# Patient Record
Sex: FEMALE | Race: WHITE
[De-identification: names, ages, dates, MRNs, and addresses within clinical notes are randomized per-mention and may not be internally consistent; named-entity substitution may affect disease eponyms.]

---

## 2018-07-17 ENCOUNTER — HOSPITAL ENCOUNTER (EMERGENCY)
Dept: HOSPITAL 97 - ER | Age: 73
Discharge: HOME | End: 2018-07-17
Payer: COMMERCIAL

## 2018-07-17 VITALS — TEMPERATURE: 99 F

## 2018-07-17 VITALS — OXYGEN SATURATION: 98 % | DIASTOLIC BLOOD PRESSURE: 64 MMHG | SYSTOLIC BLOOD PRESSURE: 139 MMHG

## 2018-07-17 DIAGNOSIS — Z95.1: ICD-10-CM
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
## 2018-07-17 DIAGNOSIS — I10: ICD-10-CM

## 2018-07-17 DIAGNOSIS — Z79.01: ICD-10-CM

## 2018-07-17 DIAGNOSIS — S80.01XA: ICD-10-CM

## 2018-07-17 DIAGNOSIS — Z79.4: ICD-10-CM

## 2018-07-17 DIAGNOSIS — E11.9: ICD-10-CM

## 2018-07-17 DIAGNOSIS — I25.2: ICD-10-CM

## 2018-07-17 DIAGNOSIS — S50.311A: ICD-10-CM

## 2018-07-17 DIAGNOSIS — S80.02XA: ICD-10-CM
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
## 2018-07-17 DIAGNOSIS — S50.312A: ICD-10-CM

## 2018-07-17 DIAGNOSIS — S00.80XA: Primary | ICD-10-CM

## 2018-07-17 PROCEDURE — 72125 CT NECK SPINE W/O DYE: CPT

## 2018-07-17 PROCEDURE — 70450 CT HEAD/BRAIN W/O DYE: CPT

## 2018-07-17 PROCEDURE — 99284 EMERGENCY DEPT VISIT MOD MDM: CPT

## 2018-07-17 NOTE — ER
Nurse's Notes                                                                                     

 [facility]                                                                

Name: Vianey Ly                                                                            

Age: 73 yrs                                                                                       

Sex: Female                                                                                       

: 1945                                                                                   

MRN: [id number]                                                                                   

Arrival Date: 2018                                                                          

Time: 19:15                                                                                       

Account#: [id number]                                                                            

Bed 15                                                                                            

Private MD: Lang Melton                                                                         

Diagnosis: Other slipping, tripping and stumbling and falls;Contusion of left knee;Contusion of   

  right knee;Abrasion of elbow;Superficial injury of head-forehead                                

                                                                                                  

Presentation:                                                                                     

                                                                                             

19:36 Presenting complaint: Patient states: Tripped over dog, hit forehead on wall and        lp1 

      landing on knees and elbows; No LOC; Complaint of pain to forehead and right knee;          

      Ambulatory on arrival to ED, swelling to right knee and bruising to forehead noted.         

      Care prior to arrival: None. Mechanism of Injury: Fall from standing position.              

19:36 Acuity: GUILLERMO 4                                                                           lp1 

19:36 Method Of Arrival: Ambulatory                                                           lp1 

19:36 Method Of Arrival: Ambulatory                                                           lp1 

19:43 Transition of care: patient was not received from another setting of care. Onset of     lp1 

      symptoms was 2018 at 17:30. Risk Assessment: Do you want to hurt yourself or       

      someone else? Patient reports no desire to harm self or others. Initial Sepsis Screen:      

      Does the patient meet any 2 criteria? No. Patient's initial sepsis screen is negative.      

      Does the patient have a suspected source of infection? No. Patient's initial sepsis         

      screen is negative.                                                                         

                                                                                                  

Historical:                                                                                       

- Allergies:                                                                                      

19:42 No Known Allergies;                                                                     lp1 

- Home Meds:                                                                                      

19:42 Plavix Oral [Active]; levothyroxine oral [Active]; Allopurinol Oral [Active]; Lantus    lp1 

      Sub-Q [Active]; Lisinopril Oral [Active]; Metformin Oral [Active]; Imdur Oral [Active];     

      Simvastatin Oral [Active];                                                                  

- PMHx:                                                                                           

19:42 Myocardial infarction; Diabetes - IDDM; Hypertension; Gout; Hyperlipidemia;             lp1 

- PSHx:                                                                                           

19:42 CABG; Tonsillectomy;                                                                    lp1 

                                                                                                  

- Immunization history:: Adult Immunizations up to date.                                          

- Social history:: Smoking status: Patient/guardian denies using tobacco.                         

- Ebola Screening: : No symptoms or risks identified at this time.                                

                                                                                                  

                                                                                                  

Screenin:43 Abuse screen: Denies threats or abuse. Denies injuries from another. Nutritional        lp1 

      screening: No deficits noted. Tuberculosis screening: No symptoms or risk factors           

      identified. Fall Risk None identified.                                                      

                                                                                                  

Assessment:                                                                                       

20:01 General: Appears in no apparent distress. Behavior is calm, cooperative, appropriate    lp1 

      for age. Pain: Complains of pain in forehead, right knee and left knee Pain currently       

      is 5 out of 10 on a pain scale. Quality of pain is described as aching. Neuro: Level of     

      Consciousness is awake, alert, obeys commands, Oriented to person, place, time,             

      situation, Moves all extremities. Full function Gait is steady, Pupils are PERRLA,          

      Reports Pain to left side of neck. Cardiovascular: Patient's skin is warm and dry.          

      Respiratory: Respiratory effort is even, unlabored. GI: No signs and/or symptoms were       

      reported involving the gastrointestinal system. : No signs and/or symptoms were           

      reported regarding the genitourinary system. EENT: No signs and/or symptoms were            

      reported regarding the EENT system. Derm: Bruising that is dark purple, on forehead and     

      right knee. Musculoskeletal: Circulation, motion, and sensation intact. Range of            

      motion: intact in all extremities.                                                          

20:30 Reassessment: Patient returned from radiology at this time.                             lp1 

21:15 Reassessment: Patient appears in no apparent distress at this time. Patient refused     lp1 

      need to ACE wrap right knee, states "I will wrap it at home".                               

                                                                                                  

Vital Signs:                                                                                      

19:38  / 58; Pulse 88; Resp 18; Temp 99.0(O); Pulse Ox 97% on R/A; Weight 81.65 kg;     lp1 

      Height 5 ft. 0 in. (152.40 cm); Pain 5/10;                                                  

21:32  / 64; Pulse 76; Resp 18; Pulse Ox 98% on R/A;                                    lp1 

19:38 Body Mass Index 35.15 (81.65 kg, 152.40 cm)                                             lp1 

                                                                                                  

Randall Coma Score:                                                                               

19:38 Eye Response: spontaneous(4). Verbal Response: oriented(5). Motor Response: obeys       lp1 

      commands(6). Total: 15.                                                                     

                                                                                                  

ED Course:                                                                                        

19:15 Patient arrived in ED.                                                                  ds1 

19:15 Lang Melton MD is Private Physician.                                                 ds1 

19:23 Marinas, Elmer, NP is PHCP.                                                           pm1 

19:23 Betito Eller MD is Attending Physician.                                                    pm1 

19:34 Ella Hayward, RN is Primary Nurse.                                                       lp1 

19:38 Triage completed.                                                                       lp1 

19:38 Arm band placed on right wrist.                                                         lp1 

19:43 Patient has correct armband on for positive identification.                             lp1 

19:48 Patient moved to CT.                                                                    nj  

19:56 CT completed. Patient tolerated procedure well. Patient moved back from CT.             nj  

19:56 Patient moved to radiology.                                                             nj  

19:56 CT Head C Spine In Process Unspecified.                                                 EDMS

20:10 X-ray completed. Patient tolerated procedure well. Patient moved back from radiology.   1 

20:10 Knee Right 3 View XRAY In Process Unspecified.                                          EDMS

20:10 Knee Left 3 View XRAY In Process Unspecified.                                           EDMS

21:01 Lang Melton MD is Referral Physician.                                                pm1 

21:21 No provider procedures requiring assistance completed. Patient did not have IV access   lp1 

      during this emergency room visit.                                                           

                                                                                                  

Administered Medications:                                                                         

No medications were administered                                                                  

                                                                                                  

                                                                                                  

Outcome:                                                                                          

21:02 Discharge ordered by MD.                                                                pm1 

21:33 Discharged to home ambulatory.                                                          lp1 

21:33 Condition: good                                                                             

21:33 Discharge instructions given to patient, Instructed on discharge instructions, follow       

      up and referral plans. Demonstrated understanding of instructions, follow-up care.          

21:34 Patient left the ED.                                                                    lp1 

                                                                                                  

Signatures:                                                                                       

Dispatcher [employer]                           EDMS                                                 

Brunilda Calderon                               1                                                  

Liberty Robledo                                1                                                  

Ella Hayward, AMPARO                         RN   lp1                                                  

Elmer Urias NP                    NP   pm1                                                  

Vimal Amezquita                                                   

                                                                                                  

**************************************************************************************************

## 2018-07-17 NOTE — RAD REPORT
EXAM DESCRIPTION:  CT - [id number] - 7/17/2018 7:56 pm

 

CLINICAL HISTORY:  Fall, head and neck injury

 

COMPARISON:  None.

 

TECHNIQUE:  Axial 5 mm thick images of the head were obtained.  Axial 2 mm thick images of the cervic
al spine were obtained with sagittal and coronal reconstruction images generated and reviewed.

 

All CT scans are performed using dose optimization technique as appropriate and may include automated
 exposure control or mA/KV adjustment according to patient size.

 

FINDINGS:

No intracranial hemorrhage, mass, edema or acute intracranial finding. No suspicion for acute infarct
ion. Mild atrophy and chronic ischemic changes are present. Ventricles are in proportion to any volum
e loss. Mastoid air cells and paranasal sinuses are clear. No globe or orbit abnormality seen.

 

Cervical body height and alignment are normal. All disc spaces except C2-3 are narrowed. There are pr
ominent anterior endplate spurs. Uncovertebral joint hypertrophy causes foraminal stenosis bilateral 
at C3-4, C4-5 and right C5-6. Mild foraminal encroachment at C6-7. Canal is borderline stenotic at C3
-4 with spinal stenosis at C4-5 and significantly at C5-6. Facet degenerative changes are comparative
ly mild. No fracture or acute bony abnormality.

 

No paraspinal mass or hematoma.

 

IMPRESSION:  Negative CT head examination for acute or significant finding.

 

Advanced cervical spine degenerative change including multilevel central spinal stenosis and foramina
l stenosis. No fracture or acute finding.

## 2018-07-17 NOTE — RAD REPORT
EXAM DESCRIPTION:  RAD - Knee Right 3 View - 7/17/2018 8:14 pm

 

CLINICAL HISTORY:  Trip and fall, knee pain

 

COMPARISON:  None.

 

FINDINGS:  No fracture, dislocation or periosteal reaction.No joint effusion seen. No joint space jef
rowing. No foreign body or other soft tissue abnormality.

 

 

IMPRESSION:  Negative right knee.

 

Clinical concerns for internal derangement or occult bony injury could be further assessed with MR im
aging.

## 2018-07-17 NOTE — RAD REPORT
EXAM DESCRIPTION:  RAD - Knee Left 3 View - 7/17/2018 8:14 pm

 

CLINICAL HISTORY:  Trip and fall, knee pain

 

COMPARISON:  None.

 

FINDINGS:  No fracture, dislocation or periosteal reaction.No joint effusion seen. No joint space jef
rowing. No soft tissue abnormality.

 

 

IMPRESSION:  Negative left knee.

 

Clinical concerns for internal derangement or occult bony injury could be further assessed with MR im
aging.

## 2021-06-30 ENCOUNTER — HOSPITAL ENCOUNTER (EMERGENCY)
Dept: HOSPITAL 97 - ER | Age: 76
Discharge: HOME | End: 2021-06-30
Payer: COMMERCIAL

## 2021-06-30 VITALS — TEMPERATURE: 99.8 F

## 2021-06-30 DIAGNOSIS — E11.9: ICD-10-CM

## 2021-06-30 DIAGNOSIS — E03.9: ICD-10-CM

## 2021-06-30 DIAGNOSIS — Z79.82: ICD-10-CM

## 2021-06-30 DIAGNOSIS — U07.1: Primary | ICD-10-CM

## 2021-06-30 DIAGNOSIS — Z79.4: ICD-10-CM

## 2021-06-30 DIAGNOSIS — I10: ICD-10-CM

## 2021-06-30 LAB
ALBUMIN SERPL BCP-MCNC: 3.5 G/DL (ref 3.4–5)
ALP SERPL-CCNC: 95 U/L (ref 45–117)
ALT SERPL W P-5'-P-CCNC: 22 U/L (ref 12–78)
AST SERPL W P-5'-P-CCNC: 46 U/L (ref 15–37)
BUN BLD-MCNC: 28 MG/DL (ref 7–18)
GLUCOSE SERPLBLD-MCNC: 127 MG/DL (ref 74–106)
HCT VFR BLD CALC: 38.1 % (ref 36–45)
LIPASE SERPL-CCNC: 28 U/L (ref 73–393)
LYMPHOCYTES # SPEC AUTO: 0.9 K/UL (ref 0.7–4.9)
PMV BLD: 8.1 FL (ref 7.6–11.3)
POTASSIUM SERPL-SCNC: 4.4 MMOL/L (ref 3.5–5.1)
RBC # BLD: 4.07 M/UL (ref 3.86–4.86)
SARS-COV-2 RNA RESP QL NAA+PROBE: POSITIVE

## 2021-06-30 PROCEDURE — 87081 CULTURE SCREEN ONLY: CPT

## 2021-06-30 PROCEDURE — 83690 ASSAY OF LIPASE: CPT

## 2021-06-30 PROCEDURE — 36415 COLL VENOUS BLD VENIPUNCTURE: CPT

## 2021-06-30 PROCEDURE — 85025 COMPLETE CBC W/AUTO DIFF WBC: CPT

## 2021-06-30 PROCEDURE — 80053 COMPREHEN METABOLIC PANEL: CPT

## 2021-06-30 PROCEDURE — 0241U: CPT

## 2021-06-30 PROCEDURE — 71045 X-RAY EXAM CHEST 1 VIEW: CPT

## 2021-06-30 PROCEDURE — 81003 URINALYSIS AUTO W/O SCOPE: CPT

## 2021-06-30 PROCEDURE — 87070 CULTURE OTHR SPECIMN AEROBIC: CPT

## 2021-06-30 PROCEDURE — 84484 ASSAY OF TROPONIN QUANT: CPT

## 2021-06-30 NOTE — EDPHYS
Physician Documentation                                                                           

 Formerly [facility]                                                                 

Name: Vianey Ly                                                                            

Age: 76 yrs                                                                                       

Sex: Female                                                                                       

: 1945                                                                                   

MRN: [id number]                                                                                   

Arrival Date: 2021                                                                          

Time: 14:34                                                                                       

Account#: [id number]                                                                            

Bed 24                                                                                            

Private MD:                                                                                       

ED Physician Marvel James                                                                    

HPI:                                                                                              

                                                                                             

21:15 This 76 yrs old  Female presents to ER via Wheelchair with complaints of       ma2 

      General Weakness, shaky, Arm Pain.                                                          

21:15 This 76 yrs old  Female presents to ER via Wheelchair with complaints of       ma2 

      General Weakness, shaky, chills, fever.                                                     

21:15 Onset: The symptoms/episode began/occurred gradually, 3 day(s) ago. Associated signs    ma2 

      and symptoms: Pertinent negatives: deformity, nausea, pain, tingling. Severity of           

      symptoms: At their worst the symptoms were mild, in the emergency department the            

      symptoms are unchanged. The patient has experienced a previous episode, The patient has     

      experienced similar episodes in the past.                                                   

                                                                                                  

Historical:                                                                                       

- Allergies:                                                                                      

15:11 No Known Allergies;                                                                     jl7 

- Home Meds:                                                                                      

15:11 Allopurinol Oral [Active]; Hydrochlorothiazide Oral [Active]; Isosorbide Dinitrate Oral jl7 

      [Active]; levothyroxine oral [Active]; liothyronine oral [Active]; Metformin Oral           

      [Active]; metoprolol succinate oral [Active]; Simvastatin Oral [Active]; Aspirin Oral       

      [Active]; doxazosin oral [Active]; insulin glargine subcutaneous Sub-Q [Active]; Plavix     

      Oral [Active]; pioglitazone oral [Active];                                                  

- PMHx:                                                                                           

15:11 Diabetes - IDDM; Gout; Hyperlipidemia; Hypertension; Myocardial infarction;             jl7 

      Hypothyroidism;                                                                             

                                                                                                  

- Immunization history:: Adult Immunizations not up to date, Client reports having NOT            

  received the Covid vaccine.                                                                     

- Social history:: Smoking status: Patient denies any tobacco usage or history of.                

- Family history:: not pertinent.                                                                 

                                                                                                  

                                                                                                  

ROS:                                                                                              

21:15 Constitutional: Negative for fever, chills, and weight loss.                            ma2 

21:15 All other systems are negative.                                                             

                                                                                                  

Exam:                                                                                             

21:15 Constitutional:  This is a well developed, well nourished patient who is awake, alert,  ma2 

      and in no acute distress. ENT:  Nares patent. No nasal discharge, no septal                 

      abnormalities noted.  Tympanic membranes are normal and external auditory canals are        

      clear.  Oropharynx with no redness, swelling, or masses, exudates, or evidence of           

      obstruction, uvula midline.  Mucous membranes moist. Neck:  Trachea midline, no             

      thyromegaly or masses palpated, and no cervical lymphadenopathy.  Supple, full range of     

      motion without nuchal rigidity, or vertebral point tenderness.  No Meningismus.             

      Chest/axilla:  Normal chest wall appearance and motion.  Nontender with no deformity.       

      No lesions are appreciated. Cardiovascular:  Regular rate and rhythm with a normal S1       

      and S2.  No gallops, murmurs, or rubs.  Normal PMI, no JVD.  No pulse deficits.             

      Respiratory:  Lungs have equal breath sounds bilaterally, clear to auscultation and         

      percussion.  No rales, rhonchi or wheezes noted.  No increased work of breathing, no        

      retractions or nasal flaring. Abdomen/GI:  Soft, non-tender, with normal bowel sounds.      

      No distension or tympany.  No guarding or rebound.  No evidence of tenderness               

      throughout. Skin:  Warm, dry with normal turgor.  Normal color with no rashes, no           

      lesions, and no evidence of cellulitis. MS/ Extremity:  Pulses equal, no cyanosis.          

      Neurovascular intact.  Full, normal range of motion. Neuro:  Awake and alert, GCS 15,       

      oriented to person, place, time, and situation.  Cranial nerves II-XII grossly intact.      

      Motor strength 5/5 in all extremities.  Sensory grossly intact.  Cerebellar exam            

      normal.  Normal gait.                                                                       

                                                                                                  

Vital Signs:                                                                                      

15:07  / 50; Pulse 79; Resp 17; Temp 99.8(O); Pulse Ox 96% on R/A; Weight 75.3 kg; Pain jl7 

      /10;                                                                                       

19:30  / 60; Pulse 91; Resp 16; Pulse Ox 96% on R/A;                                    vg1 

20:00  / 69; Pulse 86; Resp 16; Pulse Ox 95% on R/A;                                    vg1 

                                                                                                  

MDM:                                                                                              

21:15 Differential diagnosis: closed fracture, contusion, abrasion, tendonitis, uti vs covid  ma2 

      19 vs pneumonia, fever.                                                                     

22:17 Data reviewed: vital signs, nurses notes. Counseling: I had a detailed discussion with  ma2 

      the patient and/or guardian regarding: the historical points, exam findings, and any        

      diagnostic results supporting the discharge/admit diagnosis, the presence of at least       

      one elevated blood pressure reading (>120/80) during this emergency department visit,       

      the need for outpatient follow up. Response to treatment: the patient's symptoms have       

      markedly improved after treatment.                                                          

22:18 Patient medically screened.                                                             ma2 

                                                                                                  

                                                                                             

19:12 Order name: Strep; Complete Time: 20:59                                                 ma2 

                                                                                             

19:12 Order name: CBC with Diff; Complete Time: 20:59                                         ma2 

                                                                                             

19:12 Order name: CMP; Complete Time: 20:59                                                   ma2 

                                                                                             

19:12 Order name: CXR XRAY; Complete Time: 20:59                                              ma2 

                                                                                             

19:12 Order name: Lipase; Complete Time: 20:59                                                ma2 

                                                                                             

19:59 Order name: Troponin (emerg Dept Use Only)                                              ma2 

                                                                                             

20:00 Order name: Troponin (Emerg Dept Use Only); Complete Time: 20:59                        EDMS

                                                                                             

20:18 Order name: Throat Culture                                                              EDMS

                                                                                             

20:59 Order name: COVID-19/FLU A+B/RSV; Complete Time: 20:59                                  EDMS

                                                                                             

21:24 Order name: Urine Dipstick-Ancillary; Complete Time: 21:44                              EDMS

                                                                                             

19:12 Order name: Droplet/Contact Precautions; Complete Time: 19:33                           ma2 

                                                                                             

19:12 Order name: Labs collected and sent; Complete Time: 20:18                               ma2 

                                                                                             

19:12 Order name: O2 Per Protocol; Complete Time: 20:18                                       ma2 

                                                                                             

19:12 Order name: Urine Dipstick-Ancillary (obtain specimen); Complete Time: 21:23            ma2 

                                                                                                  

Administered Medications:                                                                         

20:10 Drug: NS 0.9% 500 ml Route: IV; Rate: bolus; Site: right antecubital;                   vg1 

22:00 Follow up: IV SiteChange: left antecubital; IV SiteChange Reason: Infiltration          vg1 

20:18 CANCELLED (Physician Discretion): NS 0.9% 1000 ml IV at 1 bolus Per protocol; 1000 mL   vg1 

      bolus                                                                                       

22:00 Drug: MethylPrednisoLONE 125 mg Route: IVP; Site: left antecubital;                     vg1 

22:00 Drug: AZITHromycin 500 mg Route: IVPB; Infused Over: 1 hrs; Site: left antecubital;     vg1 

                                                                                                  

                                                                                                  

Disposition Summary:                                                                              

21 22:18                                                                                    

Discharge Ordered                                                                                 

      Location: Home                                                                          ma2 

      Condition: Stable                                                                       ma2 

      Diagnosis                                                                                   

        - Other specified viral diseases - COVID -19                                          ma2 

      Followup:                                                                               ma2 

        - With: Private Physician                                                                  

        - When: Tomorrow                                                                           

        - Reason: If symptoms return, Continuance of care                                          

      Discharge Instructions:                                                                     

        - Discharge Summary Sheet                                                             ma2 

        - COVID-19                                                                            ma2 

        - COVID-19 Frequently Asked Questions                                                 ma2 

        - 10 Things You Can Do to Manage Your COVID-19 Symptoms at Home - [facility] [place][note 3]                 ma2 

      Forms:                                                                                      

        - Medication Reconciliation Form                                                      ma2 

        - Thank You Letter                                                                    ma2 

        - Antibiotic Education                                                                ma2 

        - Prescription Opioid Use                                                             ma2 

      Prescriptions:                                                                              

        - Diclofenac Sodium 75 mg Oral Tablet Sustained Release                                    

            - take 1 tablet by ORAL route 2 times per day; 30 tablet; Refills: 0, Product     ma2 

      Selection Permitted                                                                         

        - Zithromax Z-Refugio 250 mg Oral Tablet                                                       

            - take 1 tablet by ORAL route as directed for 5 days Day 1 - take two (2) tablets ma2 

      one time.  Day 2, 3, 4 , 5 take one (1) tablet once daily.; 6 tablet; Refills: 0,           

      Product Selection Permitted                                                                 

        - Medrol (Refugio) 4 mg Oral Tablets, Dose Pack                                                

            - take 1 tablet by ORAL route as directed - follow package instructions; 1        ma2 

      packet; Refills: 0, Product Selection Permitted                                             

Signatures:                                                                                       

Dispatcher [employer]                           Daniel Urrutia, AMPARO                        RN   jl7                                                  

Marvel James MD MD   ma2                                                  

Marlene Garber RN                    RN   vg1                                                  

                                                                                                  

Corrections: (The following items were deleted from the chart)                                    

19:59 19:12 Influenza Screen (A ordered. EDMS                                                 EDMS

19:59 19:13 Influenza Screen (A \T\ B)+BA.LAB.BRZ ordered. EDMS                                 EDMS

19:59 19:13 Respiratory Syncytial Virus Ag+BA.LAB.BRZ ordered. EDMS                           EDMS

20:18 19:12 NS 0.9% 1000 ml IV at 1 bolus Per protocol; 1000 mL bolus ordered. ma2            vg1 

20:18 20:18 NS 0.9% 1000 ml IV at 1 bolus Per protocol; 1000 mL bolus given. vg1              vg1 

20:18 20:18 NS 0.9% 1000 ml IV at 1 bolus Per protocol; 1000 mL bolus ordered. vg1            vg1 

                                                                                                  

**************************************************************************************************

## 2021-06-30 NOTE — XMS REPORT
Continuity of Care Document

                            Created on:2021



Patient:GABRIELLE COLLAZO

Sex:Female

:1945

External Reference #:[id number]





Demographics







                          Address                   [address] [place] [address]

 

                          Home Phone                [phone]

 

                          Work Phone                [phone]

 

                          Mobile Phone              [phone]

 

                          Email Address             NONE

 

                          Preferred Language        English

 

                          Marital Status            Unknown

 

                          Amish Affiliation     Unknown

 

                          Race                      Unknown

 

                          Additional Race(s)        Unavailable



                                                    White

 

                          Ethnic Group              Unknown









Author







                          Organization              [facility]

t

 

                          Address                   [address]



                                                    [place] [address]

 

                          Phone                     [phone]









Support







                Name            Relationship    Address         Phone

 

                Aliyah       Spouse          [address]   [phone]



                                                [place] [address] 









Care Team Providers







                    Name                Role                Phone

 

                    Linh CALDERA,      Primary Care Physician [phone]

 

                    Lloyd CALDERA            Attending Clinician [phone]









Payers







           Payer Name Policy Type Policy     Effective Date Expiration Date Sour

ce



                                 Number                           

 

           MEDICAREMEDICARE PART            [id number] 2006-10-01            Varinder DEJESUS AND                            00:00:00              [facility]



           [id number]                                             



           [place]                                             

 

           COMMERCIAL MISCMISC            [id number] 2017            [place]

on



           [id number]                       00:00:00              Met

[facility]



           /2017-Abhi                                             



           cial                                                   







Problems







       Condition Condition Condition Status Onset  Resolution Last   Treating Co

mments 

Source



       Name   Details Category        Date   Date   Treatment Clinician        



                                                 Date                 

 

       Lagophthal Lagophthal Disease Active                              H

[facility]



       mos of mos of               8-15                               [facility]



       left lower left lower               00:00:                             st



       eyelid eyelid               00                                 







Allergies, Adverse Reactions, Alerts

This patient has no known allergies or adverse reactions.



Social History







           Social Habit Start Date Stop Date  Quantity   Comments   Source

 

           Tobacco use and 2020 Never used            [facility] floododi



           exposure   00:00:00   00:00:00                         

 

           Alcohol intake 2020 Current drinker of            Varinder Suazo



                      00:00:00   00:00:00   alcohol (finding)            

 

           Alcohol Comment 2018-10-17 2018-10-17 occasional            [facility] floododist



                      00:00:00   00:00:00                         

 

           Sex Assigned At 1945                       Elias mcclure



           Birth      00:00:00   00:00:00                         









                Smoking Status  Start Date      Stop Date       Source

 

                Never smoker                                    [facility]

t







Medications







       Ordered Filled Start  Stop   Current Ordering Indication Dosage Frequency

 Signature

                    Comments            Components          Source



     Medication Medication Date Date Medication? Clinician                (SIG) 

          



     Name Name                                                   

 

     isosorbide      2020-0      Yes            30mg QD   Take 30 mg           [facility]

[place]



     dinitrate      1-24                               by mouth           Method

i



     (ISORDIL)      10:57:                               every           st



     30 MG      13                                 morning.           



     tablet                                                        

 

     aspirin      2020-0      Yes            81mg QD   Take 81 mg           [facility]

[place]



     (ECOTRIN)      1-24                               by mouth           Method

i



     81 MG      10:57:                               daily.           st



     enteric      13                                                



     coated                                                        



     tablet                                                        

 

     levothyroxi      2020-0      Yes            137ug QD   Take 137           [facility]

[place]



     ne        1-24                               mcg by           [facility]



     (SYNTHROID,      10:57:                               mouth           st



     LEVOXYL)      13                                 every           



     137 mcg                                         morning.           



     tablet                                                        

 

     liothyronin      2020-0      Yes            5ug  QD   Take 5 mcg           

[place]



     e (CYTOMEL)      1-24                               by mouth           Meth

thais



     5 MCG      10:57:                               daily.           st



     tablet      13                                                

 

     simvastatin      2020-0      Yes            40mg QD   Take 40 mg           

[place]



     (ZOCOR) 40      1-24                               by mouth           Metho

di



     MG tablet      10:57:                               nightly.           st



               13                                                

 

     metFORMIN      2020-0      Yes            1000mg QD   Take 1,000           

[place]



     (GLUCOPHAGE      1-24                               mg by           [facility]



     ) 1,000 mg      10:57:                               mouth           st



     tablet      13                                 every           



                                                  morning.           

 

     insulin      2020-0      Yes            46U  QD   Inject 46           [facility]

on



     glargine,hu      1-24                               Units           Ericka



     .rec.anlog      10:57:                               under the           s

t



     (LANTUS      13                                 skin every           



     U-100                                         evening.           



     INSULIN                                                        



     SUBQ)                                                        

 

     cholecalcif      2020-0      Yes            2000U QD   Take 2,000          

 [place]



     joyce,      1-24                               Units by           [facility]



     vitamin D3,      10:57:                               mouth           st



     (VITAMIN      13                                 daily.           



     D3) 2,000                                                        



     unit                                                        



     capsule                                                        



     capsule                                                        

 

     doxazosin      2020-0      Yes            2mg  Q.5D Take 2 mg           [facility]

[place]



     (CARDURA) 2      1-24                               by mouth 2           Me

[facility]



     MG tablet      10:57:                               (two)           st



               13                                 times a           



                                                  day.           

 

     melatonin      2020-0      Yes            10mg QD   Take 10 mg           [facility]

[place]



     10 mg      1-24                               by mouth           [facility]



     capsule      10:57:                               nightly.           st



               13                                                

 

     allopurinol      2020-0      Yes            300mg QD   Take 300           [facility]

[place]



     (ZYLOPRIM)      1-24                               mg by           [facility]



     300 MG      10:57:                               mouth           st



     tablet      13                                 every           



                                                  morning.           

 

     ranitidine            Yes            150mg QD   Take 150           [facility]

[place]



     (ZANTAC)      1-24                               mg by           [facility]



     150 MG      10:57:                               mouth           st



     tablet      13                                 nightly.           

 

     metoprolol            Yes            50mg QD   Take 50 mg           [facility]

[place]



     succinate      1-24                               by mouth           Method

i



     XL        10:57:                               every           st



     (TOPROL-XL)      13                                 morning.           



     50 mg 24 hr                                                        



     tablet                                                        

 

     clopidogrel            Yes                                     [place]

n



     (PLAVIX) 75      8-16                                              [facility]



     mg tablet      00:00:                                              st



               00                                                







Procedures

This patient has no known procedures.



Plan of Care







             Planned Activity Planned Date Details      Comments     Source

 

             Future Scheduled 2021   INFLUENZA VACCINE              [place]

n [facility]



             Test         00:00:00     [code = INFLUENZA              



                                       VACCINE]                  

 

             Future Scheduled 2021   DIABETES: RETINAL EYE              [facility]

[place] [facility]



             Test         00:00:00     EXAM [code = DIABETES:              



                                       RETINAL EYE EXAM]              

 

             Future Scheduled 1997   BREAST CANCER              [facility]

[facility]



             Test         00:00:00     SCREENING [code =              



                                       BREAST CANCER              



                                       SCREENING]                

 

             Future Scheduled 1997   COLONOSCOPY SCREENING              [facility]

[place] [facility]



             Test         00:00:00     [code = COLONOSCOPY              



                                       SCREENING]                

 

             Future Scheduled 1997   SHINGLES VACCINES (#1)              [facility]

[place] [facility]



             Test         00:00:00     [code = SHINGLES              



                                       VACCINES (#1)]              

 

             Future Scheduled 1965   Hepatitis C screening              [facility]

[place] [facility]



             Test         00:00:00     (procedure) [code =              



                                       904158862]                

 

             Future Scheduled 1959   COVID-19 VACCINE (1)              [facility]

[facility]



             Test         00:00:00     [code = COVID-19              



                                       VACCINE (1)]              

 

             Future Scheduled 1957   DIABETIC FOOT EXAM              [facility]

on [facility]



             Test         00:00:00     [code = DIABETIC FOOT              



                                       EXAM]                     

 

             Future Scheduled 1953   65+ PNEUMOCOCCAL              [place]

 [facility]



             Test         00:00:00     VACCINE (1 of 4 -              



                                       PCV13) [code = 65+              



                                       PNEUMOCOCCAL VACCINE              



                                       (1 of 4 - PCV13)]              







Encounters







        Start   End     Encounter Admission Attending Care    Care    Encounter 

Source



        Date/Time Date/Time Type    Type    Clinicians Facility Department ID   

   

 

        2020 Outpatient         [facility]     [id number]

[address]



        00:00:00 00:00:00                 [facility]                   [address]     [facility]

i



                                                                        st







Results

This patient has no known results.

## 2021-06-30 NOTE — RAD REPORT
EXAM DESCRIPTION:  RAD - Chest Single View - 6/30/2021 7:40 pm

 

CLINICAL HISTORY:  CONGESTION

 

COMPARISON:  Two view chest June 17

 

TECHNIQUE:  AP portable chest image was obtained 6/30/2021 7:40 pm .

 

FINDINGS:  Lung volumes are low. This accentuates the interstitial pattern potentially masking early 
edema or infiltrate. No dense consolidation identifiable. There is questionable increased interstitia
l opacification the base the right upper lobe. Heart and vasculature are normal. No measurable pleura
l effusion and no pneumothorax. No acute bony abnormality seen. No acute aortic findings suspected.

 

IMPRESSION:  Limited portable study without acute cardiopulmonary finding.

 

Subtle increased opacification inferior aspect right upper lobe. This is probably shallow inspiration
 artifact but can be monitored on subsequent imaging.

## 2021-06-30 NOTE — ER
Nurse's Notes                                                                                     

 [facility]                                                                 

Name: Vianey Ly                                                                            

Age: 76 yrs                                                                                       

Sex: Female                                                                                       

: 1945                                                                                   

MRN: [id number]                                                                                   

Arrival Date: 2021                                                                          

Time: 14:34                                                                                       

Account#: [id number]                                                                            

Bed 24                                                                                            

Private MD:                                                                                       

Diagnosis: Other specified viral diseases-COVID -19                                               

                                                                                                  

Presentation:                                                                                     

                                                                                             

15:07 Chief complaint: Patient states: Right arm pain that radiates to right thumb, decreased jl7 

      appetite, shakiness and general weakness x 3 weeks, denies N/V/D, cough and fever x 2       

      days. Coronavirus screen: cough unrelated to allergies, fatigue, fever, Client presents     

      with at least one sign or symptom that may indicate coronavirus-19. Standard/surgical       

      mask placed on the client. Provider contacted for isolation considerations. Ebola           

      Screen: No symptoms or risks identified at this time. Initial Sepsis Screen: Does the       

      patient meet any 2 criteria? No. Patient's initial sepsis screen is negative. Does the      

      patient have a suspected source of infection? No. Patient's initial sepsis screen is        

      negative. Risk Assessment: Do you want to hurt yourself or someone else? Patient            

      reports no desire to harm self or others. Onset of symptoms was 2021. Care         

      prior to arrival: None.                                                                     

15:07 Method Of Arrival: Wheelchair                                                           jl7 

15:07 Acuity: GUILLERMO 3                                                                           jl7 

                                                                                                  

Historical:                                                                                       

- Allergies:                                                                                      

15:11 No Known Allergies;                                                                     jl7 

- Home Meds:                                                                                      

15:11 Allopurinol Oral [Active]; Hydrochlorothiazide Oral [Active]; Isosorbide Dinitrate Oral jl7 

      [Active]; levothyroxine oral [Active]; liothyronine oral [Active]; Metformin Oral           

      [Active]; metoprolol succinate oral [Active]; Simvastatin Oral [Active]; Aspirin Oral       

      [Active]; doxazosin oral [Active]; insulin glargine subcutaneous Sub-Q [Active]; Plavix     

      Oral [Active]; pioglitazone oral [Active];                                                  

- PMHx:                                                                                           

15:11 Diabetes - IDDM; Gout; Hyperlipidemia; Hypertension; Myocardial infarction;             jl7 

      Hypothyroidism;                                                                             

                                                                                                  

- Immunization history:: Adult Immunizations not up to date, Client reports having NOT            

  received the Covid vaccine.                                                                     

- Social history:: Smoking status: Patient denies any tobacco usage or history of.                

- Family history:: not pertinent.                                                                 

                                                                                                  

                                                                                                  

Screenin:30 Abuse screen: Denies threats or abuse. Nutritional screening: loss of appetite.         vg1 

      Tuberculosis screening: No symptoms or risk factors identified. Fall Risk No fall in        

      past 12 months (0 pts). No secondary diagnosis (0 pts). IV access (20 points).              

      Ambulatory Aid- None/Bed Rest/Nurse Assist (0 pts). Gait- Weak (10 pts.). Mental            

      Status- Oriented to own ability (0 pts). [place] Fall Scale indicates No Risk (0-24     

      pts).                                                                                       

                                                                                                  

Assessment:                                                                                       

20:14 General: Appears in no apparent distress. comfortable, Behavior is calm, cooperative.   vg1 

      Pain: Denies pain. Neuro: Level of Consciousness is awake, alert, obeys commands,           

      Oriented to person, place, time, situation, Reports weakness. Cardiovascular: Patient's     

      skin is warm and dry. Respiratory: Reports cough that is dry, Airway is patent              

      Respiratory effort is even, unlabored. GI: No signs and/or symptoms were reported           

      involving the gastrointestinal system. : No signs and/or symptoms were reported           

      regarding the genitourinary system. EENT: No signs and/or symptoms were reported            

      regarding the EENT system. Derm: Skin is intact, Skin is pink, warm \T\ dry.                

      Musculoskeletal: Circulation, motion, and sensation intact.                                 

22:02 Reassessment: Patient appears in no apparent distress at this time. No changes from     vg1 

      previously documented assessment. Patient and/or family updated on plan of care and         

      expected duration. Pain level reassessed. Patient is alert, oriented x 3, equal             

      unlabored respirations, skin warm/dry/pink.                                                 

                                                                                                  

Vital Signs:                                                                                      

15:07  / 50; Pulse 79; Resp 17; Temp 99.8(O); Pulse Ox 96% on R/A; Weight 75.3 kg; Pain jl7 

      1/10;                                                                                       

19:30  / 60; Pulse 91; Resp 16; Pulse Ox 96% on R/A;                                    vg1 

20:00  / 69; Pulse 86; Resp 16; Pulse Ox 95% on R/A;                                    vg1 

                                                                                                  

ED Course:                                                                                        

14:34 Patient arrived in ED.                                                                  am2 

15:11 Triage completed.                                                                       jl7 

15:11 Arm band placed on right wrist.                                                         jl7 

18:55 Marvel James MD is Attending Physician.                                           ma2 

19:30 Patient has correct armband on for positive identification. Bed in low position. Call   vg1 

      light in reach. Side rails up X2. Adult w/ patient.                                         

19:30 Initial lab(s) drawn, by me, sent to lab. Inserted saline lock: 22 gauge in right       vg1 

      antecubital area, using aseptic technique. Blood collected.                                 

19:31 Marlene Garber, RN is Primary Nurse.                                                  vg1 

19:40 CXR XRAY In Process Unspecified.                                                        EDMS

21:00 IV discontinued, intact, bleeding controlled, No redness/swelling at site. Pressure     vg1 

      dressing applied, due to infiltration.                                                      

21:30 Missed attempt(s): 22 gauge in left hand.                                               vg1 

21:57 Inserted saline lock: 22 gauge in left antecubital area, using aseptic technique.       mb4 

23:15 Primary Nurse role handed off by Marlene Garber, RN                                   mw2 

                                                                                                  

Administered Medications:                                                                         

20:10 Drug: NS 0.9% 500 ml Route: IV; Rate: bolus; Site: right antecubital;                   vg1 

22:00 Follow up: IV SiteChange: left antecubital; IV SiteChange Reason: Infiltration          vg1 

20:18 CANCELLED (Physician Discretion): NS 0.9% 1000 ml IV at 1 bolus Per protocol; 1000 mL   vg1 

      bolus                                                                                       

22:00 Drug: MethylPrednisoLONE 125 mg Route: IVP; Site: left antecubital;                     vg1 

22:00 Drug: AZITHromycin 500 mg Route: IVPB; Infused Over: 1 hrs; Site: left antecubital;     vg1 

                                                                                                  

                                                                                                  

Outcome:                                                                                          

22:18 Discharge ordered by MD.                                                                ma2 

23:29 Discharged to home via wheelchair.                                                      tr6 

23:29 Condition: unchanged                                                                        

23:29 Discharge instructions given to patient, family, Instructed on discharge instructions,      

      follow up and referral plans. no drinking with medication, medication usage, safety         

      practices, Demonstrated understanding of instructions, follow-up care, medications,         

      wound care, Prescriptions given X 4.                                                        

23:29 Patient left the ED.                                                                    tr6 

                                                                                                  

Signatures:                                                                                       

Dispatcher [employer]                           Daniel Urrutia RN RN jl7 Moreno, Amanda am2 Alzahri, Mohammad, MD MD ma2 [place], MyKena                            mw2                                                  

Luna Flores                            mb4                                                  

Marlene Garber RN RN   vg1                                                  

Ashley Ferrari RN                   RN   tr6                                                  

                                                                                                  

Corrections: (The following items were deleted from the chart)                                    

20:18 20:10 NS 0.9% 1000 ml IV at 1 bolus in right antecubital vg1                            vg1 

                                                                                                  

**************************************************************************************************

## 2021-07-01 VITALS — OXYGEN SATURATION: 95 % | SYSTOLIC BLOOD PRESSURE: 165 MMHG | DIASTOLIC BLOOD PRESSURE: 69 MMHG

## 2021-07-06 ENCOUNTER — HOSPITAL ENCOUNTER (INPATIENT)
Dept: HOSPITAL 97 - ER | Age: 76
LOS: 12 days | Discharge: HOME | DRG: 177 | End: 2021-07-18
Attending: FAMILY MEDICINE | Admitting: FAMILY MEDICINE
Payer: COMMERCIAL

## 2021-07-06 VITALS — BODY MASS INDEX: 33.8 KG/M2

## 2021-07-06 DIAGNOSIS — I12.9: ICD-10-CM

## 2021-07-06 DIAGNOSIS — E87.6: ICD-10-CM

## 2021-07-06 DIAGNOSIS — K59.00: ICD-10-CM

## 2021-07-06 DIAGNOSIS — E87.1: ICD-10-CM

## 2021-07-06 DIAGNOSIS — N17.9: ICD-10-CM

## 2021-07-06 DIAGNOSIS — Z95.1: ICD-10-CM

## 2021-07-06 DIAGNOSIS — E11.22: ICD-10-CM

## 2021-07-06 DIAGNOSIS — N39.0: ICD-10-CM

## 2021-07-06 DIAGNOSIS — U07.1: Primary | ICD-10-CM

## 2021-07-06 DIAGNOSIS — E78.5: ICD-10-CM

## 2021-07-06 DIAGNOSIS — I25.10: ICD-10-CM

## 2021-07-06 DIAGNOSIS — E11.65: ICD-10-CM

## 2021-07-06 DIAGNOSIS — N18.30: ICD-10-CM

## 2021-07-06 DIAGNOSIS — E83.51: ICD-10-CM

## 2021-07-06 DIAGNOSIS — B96.1: ICD-10-CM

## 2021-07-06 DIAGNOSIS — E66.9: ICD-10-CM

## 2021-07-06 DIAGNOSIS — I21.4: ICD-10-CM

## 2021-07-06 DIAGNOSIS — E03.9: ICD-10-CM

## 2021-07-06 DIAGNOSIS — B96.4: ICD-10-CM

## 2021-07-06 DIAGNOSIS — J12.82: ICD-10-CM

## 2021-07-06 DIAGNOSIS — E44.0: ICD-10-CM

## 2021-07-06 DIAGNOSIS — Z16.24: ICD-10-CM

## 2021-07-06 DIAGNOSIS — J96.01: ICD-10-CM

## 2021-07-06 LAB
ALBUMIN SERPL BCP-MCNC: 2.4 G/DL (ref 3.4–5)
ALP SERPL-CCNC: 124 U/L (ref 45–117)
ALT SERPL W P-5'-P-CCNC: 17 U/L (ref 12–78)
AST SERPL W P-5'-P-CCNC: 42 U/L (ref 15–37)
BLD SMEAR INTERP: (no result)
BUN BLD-MCNC: 56 MG/DL (ref 7–18)
CRP SERPL-MCNC: 211 MG/L (ref ?–3)
FERRITIN SERPL-MCNC: 2465.9 NG/ML (ref 8–388)
GLUCOSE SERPLBLD-MCNC: 357 MG/DL (ref 74–106)
HCT VFR BLD CALC: 35.1 % (ref 36–45)
INR BLD: 1.05
LIPASE SERPL-CCNC: 11 U/L (ref 73–393)
LYMPHOCYTES # SPEC AUTO: 0.7 K/UL (ref 0.7–4.9)
MORPHOLOGY BLD-IMP: (no result)
PMV BLD: 8.9 FL (ref 7.6–11.3)
POTASSIUM SERPL-SCNC: 4.8 MMOL/L (ref 3.5–5.1)
RBC # BLD: 3.79 M/UL (ref 3.86–4.86)
TROPONIN (EMERG DEPT USE ONLY): 0.58 NG/ML (ref 0–0.04)

## 2021-07-06 PROCEDURE — 83036 HEMOGLOBIN GLYCOSYLATED A1C: CPT

## 2021-07-06 PROCEDURE — 87077 CULTURE AEROBIC IDENTIFY: CPT

## 2021-07-06 PROCEDURE — 94760 N-INVAS EAR/PLS OXIMETRY 1: CPT

## 2021-07-06 PROCEDURE — 83690 ASSAY OF LIPASE: CPT

## 2021-07-06 PROCEDURE — 87040 BLOOD CULTURE FOR BACTERIA: CPT

## 2021-07-06 PROCEDURE — 96366 THER/PROPH/DIAG IV INF ADDON: CPT

## 2021-07-06 PROCEDURE — 85025 COMPLETE CBC W/AUTO DIFF WBC: CPT

## 2021-07-06 PROCEDURE — 82947 ASSAY GLUCOSE BLOOD QUANT: CPT

## 2021-07-06 PROCEDURE — 94002 VENT MGMT INPAT INIT DAY: CPT

## 2021-07-06 PROCEDURE — 97161 PT EVAL LOW COMPLEX 20 MIN: CPT

## 2021-07-06 PROCEDURE — 87086 URINE CULTURE/COLONY COUNT: CPT

## 2021-07-06 PROCEDURE — 82728 ASSAY OF FERRITIN: CPT

## 2021-07-06 PROCEDURE — 86140 C-REACTIVE PROTEIN: CPT

## 2021-07-06 PROCEDURE — 81015 MICROSCOPIC EXAM OF URINE: CPT

## 2021-07-06 PROCEDURE — 80048 BASIC METABOLIC PNL TOTAL CA: CPT

## 2021-07-06 PROCEDURE — 85730 THROMBOPLASTIN TIME PARTIAL: CPT

## 2021-07-06 PROCEDURE — 83880 ASSAY OF NATRIURETIC PEPTIDE: CPT

## 2021-07-06 PROCEDURE — 71045 X-RAY EXAM CHEST 1 VIEW: CPT

## 2021-07-06 PROCEDURE — 81001 URINALYSIS AUTO W/SCOPE: CPT

## 2021-07-06 PROCEDURE — 81003 URINALYSIS AUTO W/O SCOPE: CPT

## 2021-07-06 PROCEDURE — 84100 ASSAY OF PHOSPHORUS: CPT

## 2021-07-06 PROCEDURE — 84145 PROCALCITONIN (PCT): CPT

## 2021-07-06 PROCEDURE — 97530 THERAPEUTIC ACTIVITIES: CPT

## 2021-07-06 PROCEDURE — 96372 THER/PROPH/DIAG INJ SC/IM: CPT

## 2021-07-06 PROCEDURE — 96375 TX/PRO/DX INJ NEW DRUG ADDON: CPT

## 2021-07-06 PROCEDURE — 93005 ELECTROCARDIOGRAM TRACING: CPT

## 2021-07-06 PROCEDURE — 36415 COLL VENOUS BLD VENIPUNCTURE: CPT

## 2021-07-06 PROCEDURE — 80053 COMPREHEN METABOLIC PANEL: CPT

## 2021-07-06 PROCEDURE — 80076 HEPATIC FUNCTION PANEL: CPT

## 2021-07-06 PROCEDURE — 97116 GAIT TRAINING THERAPY: CPT

## 2021-07-06 PROCEDURE — 83735 ASSAY OF MAGNESIUM: CPT

## 2021-07-06 PROCEDURE — 93306 TTE W/DOPPLER COMPLETE: CPT

## 2021-07-06 PROCEDURE — 99284 EMERGENCY DEPT VISIT MOD MDM: CPT

## 2021-07-06 PROCEDURE — 82248 BILIRUBIN DIRECT: CPT

## 2021-07-06 PROCEDURE — 87088 URINE BACTERIA CULTURE: CPT

## 2021-07-06 PROCEDURE — 94003 VENT MGMT INPAT SUBQ DAY: CPT

## 2021-07-06 PROCEDURE — 80061 LIPID PANEL: CPT

## 2021-07-06 PROCEDURE — 96365 THER/PROPH/DIAG IV INF INIT: CPT

## 2021-07-06 PROCEDURE — 84484 ASSAY OF TROPONIN QUANT: CPT

## 2021-07-06 PROCEDURE — 87186 SC STD MICRODIL/AGAR DIL: CPT

## 2021-07-06 PROCEDURE — 84443 ASSAY THYROID STIM HORMONE: CPT

## 2021-07-06 PROCEDURE — 83605 ASSAY OF LACTIC ACID: CPT

## 2021-07-06 PROCEDURE — 85610 PROTHROMBIN TIME: CPT

## 2021-07-06 PROCEDURE — 36569 INSJ PICC 5 YR+ W/O IMAGING: CPT

## 2021-07-06 RX ADMIN — HUMAN INSULIN SCH UNIT: 100 INJECTION, SOLUTION SUBCUTANEOUS at 22:00

## 2021-07-06 RX ADMIN — Medication SCH ML: at 22:00

## 2021-07-06 RX ADMIN — SODIUM CHLORIDE SCH MLS: 0.9 INJECTION, SOLUTION INTRAVENOUS at 22:00

## 2021-07-06 RX ADMIN — Medication SCH MG: at 22:00

## 2021-07-06 NOTE — XMS REPORT
Continuity of Care Document

                             Created on:2021



Patient:GABRIELLE COLLAZO

Sex:Female

:1945

External Reference #:[id number]





Demographics







                          Address                   [address] [place] [address]

 

                          Home Phone                [phone]

 

                          Work Phone                [phone]

 

                          Mobile Phone              [phone]

 

                          Email Address             NONE

 

                          Preferred Language        English

 

                          Marital Status            Unknown

 

                          M[employer]on Affiliation     Unknown

 

                          Race                      Unknown

 

                          Additional Race(s)        Unavailable



                                                    White

 

                          Ethnic Group              Unknown









Author







                          Organization              [facility]

t

 

                          Address                   [address]



                                                    [place] [address]

 

                          Phone                     [phone]









Support







                Name            Relationship    Address         Phone

 

                Aliyah       Spouse          [address] [facility]   [phone]



                                                [place] [address] 









Care Team Providers







                    Name                Role                Phone

 

                    Linh CALDERA,      Primary Care Physician [phone]

 

                    Lloyd CALDERA            Attending Clinician [phone]









Payers







           Payer Name Policy Type Policy     Effective Date Expiration Date Sour

ce



                                 Number                           

 

           MEDICAREMEDICARE PART            [id number] 2006-10-01            Varinder DEJESUS AND                            00:00:00              [place]



           [id number]                                             



           [place]                                             

 

           COMMERCIAL MISCMISC            [id number]            [facility]

on



           [id number]                       00:00:00              Met

[facility]



           /2017-Abhi                                             



           cial                                                   







Problems







       Condition Condition Condition Status Onset  Resolution Last   Treating Co

mments 

Source



       Name   Details Category        Date   Date   Treatment Clinician        



                                                 Date                 

 

       Lagophthal Lagophthal Disease Active                              H

[facility]



       mos of mos of               8-15                               [facility]



       left lower left lower               00:00:                             st



       eyelid eyelid               00                                 







Allergies, Adverse Reactions, Alerts

This patient has no known allergies or adverse reactions.



Social History







           Social Habit Start Date Stop Date  Quantity   Comments   Source

 

           Tobacco use and 2020 Never used            [facility] floododi



           exposure   00:00:00   00:00:00                         

 

           Alcohol intake 2020 Current drinker of            Varinder Suazo



                      00:00:00   00:00:00   alcohol (finding)            

 

           Alcohol Comment 2018-10-17 2018-10-17 occasional            [facility] floododist



                      00:00:00   00:00:00                         

 

           Sex Assigned At 1945                       Elias mcclure



           Birth      00:00:00   00:00:00                         









                Smoking Status  Start Date      Stop Date       Source

 

                Never smoker                                    [facility]

t







Medications







       Ordered Filled Start  Stop   Current Ordering Indication Dosage Frequency

 Signature

                    Comments            Components          Source



     Medication Medication Date Date Medication? Clinician                (SIG) 

          



     Name Name                                                   

 

     isosorbide      2020-0      Yes            30mg QD   Take 30 mg           [facility]

[place]



     dinitrate      1-24                               by mouth           Method

i



     (ISORDIL)      10:57:                               every           st



     30 MG      13                                 morning.           



     tablet                                                        

 

     aspirin      2020-0      Yes            81mg QD   Take 81 mg           [facility]

[place]



     (ECOTRIN)      1-24                               by mouth           Method

i



     81 MG      10:57:                               daily.           st



     enteric      13                                                



     coated                                                        



     tablet                                                        

 

     levothyroxi      2020-0      Yes            137ug QD   Take 137           [facility]

[place]



     ne        1-24                               mcg by           [facility]



     (SYNTHROID,      10:57:                               mouth           st



     LEVOXYL)      13                                 every           



     137 mcg                                         morning.           



     tablet                                                        

 

     liothyronin      2020-0      Yes            5ug  QD   Take 5 mcg           

[place]



     e (CYTOMEL)      1-24                               by mouth           [facility]eth

thais



     5 MCG      10:57:                               daily.           st



     tablet      13                                                

 

     simvastatin      2020-0      Yes            40mg QD   Take 40 mg           

[place]



     (ZOCOR) 40      1-24                               by mouth           Metho

di



     MG tablet      10:57:                               nightly.           st



               13                                                

 

     metFORMIN      2020-0      Yes            1000mg QD   Take 1,000           

[place]



     (GLUCOPHAGE      1-24                               mg by           [facility]



     ) 1,000 mg      10:57:                               mouth           st



     tablet      13                                 every           



                                                  morning.           

 

     insulin      2020-0      Yes            46U  QD   Inject 46           [facility]

on



     glargine,hu      1-24                               Units           Ericka



     .rec.anlog      10:57:                               under the           s

t



     (LANTUS      13                                 skin every           



     U-100                                         evening.           



     INSULIN                                                        



     SUBQ)                                                        

 

     cholecalcif      2020-0      Yes            2000U QD   Take 2,000          

 [facility]



     joyce,      1-24                               Units by           [facility]



     vitamin D3,      10:57:                               mouth           st



     (VITAMIN      13                                 daily.           



     D3) 2,000                                                        



     unit                                                        



     capsule                                                        



     capsule                                                        

 

     doxazosin      2020-0      Yes            2mg  Q.5D Take 2 mg           [facility]

[place]



     (CARDURA) 2      1-24                               by mouth 2           Me

thodi



     MG tablet      10:57:                               (two)           st



               13                                 times a           



                                                  day.           

 

     melatonin      2020-0      Yes            10mg QD   Take 10 mg           [facility]

[place]



     10 mg      1-24                               by mouth           [facility]



     capsule      10:57:                               nightly.           st



               13                                                

 

     allopurinol      2020-0      Yes            300mg QD   Take 300           [facility]

[place]



     (ZYLOPRIM)      1-24                               mg by           [facility]



     300 MG      10:57:                               mouth           st



     tablet      13                                 every           



                                                  morning.           

 

     ranitidine            Yes            150mg QD   Take 150           [facility]

[place]



     (ZANTAC)      1-24                               mg by           [facility]



     150 MG      10:57:                               mouth           st



     tablet      13                                 nightly.           

 

     metoprolol            Yes            50mg QD   Take 50 mg           [facility]

[place]



     succinate      1-24                               by mouth           Method

i



     XL        10:57:                               every           st



     (TOPROL-XL)      13                                 morning.           



     50 mg 24 hr                                                        



     tablet                                                        

 

     clopidogrel            Yes                                     [place]

n



     (PLAVIX) 75      8-16                                              [facility]



     mg tablet      00:00:                                              st



               00                                                







Procedures

This patient has no known procedures.



Plan of Care







             Planned Activity Planned Date Details      Comments     Source

 

             Future Scheduled 2021   INFLUENZA VACCINE              [place]

n [facility]



             Test         00:00:00     [code = INFLUENZA              



                                       VACCINE]                  

 

             Future Scheduled 2021   DIABETES: RETINAL EYE              [facility]

[place] [facility]



             Test         00:00:00     EXAM [code = DIABETES:              



                                       RETINAL EYE EXAM]              

 

             Future Scheduled 1997   BREAST CANCER              [facility]

[facility]



             Test         00:00:00     SCREENING [code =              



                                       BREAST CANCER              



                                       SCREENING]                

 

             Future Scheduled 1997   COLONOSCOPY SCREENING              [facility]

[place] [facility]



             Test         00:00:00     [code = COLONOSCOPY              



                                       SCREENING]                

 

             Future Scheduled 1997   SHINGLES VACCINES (#1)              [facility]

[place] [facility]



             Test         00:00:00     [code = SHINGLES              



                                       VACCINES (#1)]              

 

             Future Scheduled 1965   Hepatitis C screening              [facility]

[place] [facility]



             Test         00:00:00     (procedure) [code =              



                                       615421348]                

 

             Future Scheduled 1959   COVID-19 VACCINE (1)              [facility]

[facility]



             Test         00:00:00     [code = COVID-19              



                                       VACCINE (1)]              

 

             Future Scheduled 1957   DIABETIC FOOT EXAM              [facility]

[facility]



             Test         00:00:00     [code = DIABETIC FOOT              



                                       EXAM]                     

 

             Future Scheduled 1953   65+ PNEUMOCOCCAL              [facility]

 [facility]



             Test         00:00:00     VACCINE (1 of 4 -              



                                       PCV13) [code = 65+              



                                       PNEUMOCOCCAL VACCINE              



                                       (1 of 4 - PCV13)]              







Encounters







        Start   End     Encounter Admission Attending Care    Care    Encounter 

Source



        Date/Time Date/Time Type    Type    Clinicians Facility Department ID   

   

 

        [id number]

[address] [facility]



        00:00:00 00:00:00                 [facility]                   [facility]     [facility]

i



                                                                        st







Results

This patient has no known results.

## 2021-07-06 NOTE — RAD REPORT
EXAM DESCRIPTION:  RAD - Chest Single View - 7/6/2021 3:20 pm

 

CLINICAL HISTORY:  Weakness, COVID positive

 

COMPARISON:  June 30

 

TECHNIQUE:  AP portable chest image was obtained 7/6/2021 3:20 pm .

 

FINDINGS:  Lung volumes are low. Interstitial opacification is evident similar to comparison. No dens
e consolidation. There are some scattered alveolar opacities. Pattern is not specific but can be seen
 with a mild COVID-19 pneumonia.

 

Sternotomy wires are in place. Trachea is midline. Heart and vasculature are normal. No measurable pl
eural effusion and no pneumothorax. No acute bony abnormality seen. No acute aortic findings suspecte
d.

 

IMPRESSION:  Interstitial and patchy alveolar opacities are present.

 

Pattern is nonspecific but could indicate a mild COVID-19 pneumonia.

## 2021-07-06 NOTE — ER
Nurse's Notes                                                                                     

 [facility]                                                                 

Name: Vianey Ly                                                                            

Age: 76 yrs                                                                                       

Sex: Female                                                                                       

: 1945                                                                                   

MRN: [id number]                                                                                   

Arrival Date: 2021                                                                          

Time: 14:33                                                                                       

Account#: [id number]                                                                            

Bed 28                                                                                            

Private MD:                                                                                       

Diagnosis: NSTEMI;Pneumonia due to SARS-associated coronavirus;UTI/ Urinary tract infection, site 

  not specified;Candidiasis, unspecified                                                          

                                                                                                  

Presentation:                                                                                     

                                                                                             

14:34 Chief complaint: EMS states: toned out for generalized weakness. Diagnosed with COVID x ld1 

      1 week. Coronavirus screen: At this time, the client does not indicate any symptoms         

      associated with coronavirus-19. Ebola Screen: No symptoms or risks identified at this       

      time. Initial Sepsis Screen: Does the patient meet any 2 criteria? No. Patient's            

      initial sepsis screen is negative. Does the patient have a suspected source of              

      infection? No. Patient's initial sepsis screen is negative. Risk Assessment: Do you         

      want to hurt yourself or someone else? Patient reports no desire to harm self or            

      others. Onset of symptoms was 2021.                                                 

14:34 Method Of Arrival: EMS: [facility]                                                ld1 

14:34 Acuity: GUILLERMO 3                                                                           ld1 

                                                                                                  

Triage Assessment:                                                                                

14:37 General: Appears in no apparent distress. comfortable, Behavior is calm, cooperative,   ld1 

      appropriate for age. Pain: Denies pain. EENT: No signs and/or symptoms were reported        

      regarding the EENT system. Neuro: Level of Consciousness is awake, alert, obeys             

      commands, Oriented to person, place, time, situation. Cardiovascular: Capillary refill      

      < 3 seconds Patient's skin is warm and dry. Respiratory: Airway is patent Respiratory       

      effort is even, unlabored, Respiratory pattern is regular, symmetrical. GI: Abdomen is      

      round non-distended. : No signs and/or symptoms were reported regarding the               

      genitourinary system. Derm: No signs and/or symptoms reported regarding the                 

      dermatologic system. Musculoskeletal: No signs and/or symptoms reported regarding the       

      musculoskeletal system.                                                                     

                                                                                                  

Historical:                                                                                       

- Allergies:                                                                                      

14:37 No Known Allergies;                                                                     ld1 

- Home Meds:                                                                                      

14:35 Allopurinol Oral [Active]; Aspirin Oral [Active]; doxazosin Oral [Active];              ld1 

      Hydrochlorothiazide Oral [Active]; Imdur Oral [Active]; insulin glargine subcutaneous       

      Sub-Q [Active]; isosorbide dinitrate Oral [Active]; Lantus Sub-Q [Active];                  

      levothyroxine oral [Active]; liothyronine Oral [Active]; lisinopril Oral [Active];          

      Metformin Oral [Active]; metoprolol succinate Oral [Active]; pioglitazone Oral              

      [Active]; Plavix Oral [Active]; Simvastatin Oral [Active];                                  

- PMHx:                                                                                           

14:35 Diabetes - IDDM; Gout; Hyperlipidemia; Hypertension; Hypothyroidism; Myocardial         ld1 

      infarction;                                                                                 

                                                                                                  

- Immunization history:: Adult Immunizations up to date.                                          

- Social history:: Smoking status: unknown.                                                       

                                                                                                  

                                                                                                  

Screenin:39 Abuse screen: Denies threats or abuse. Denies injuries from another. Nutritional        ld1 

      screening: No deficits noted. Tuberculosis screening: No symptoms or risk factors           

      identified. Fall Risk None identified.                                                      

                                                                                                  

Assessment:                                                                                       

14:39 Reassessment: See triage assessment.                                                    ld1 

15:48 Reassessment: Patient appears in no apparent distress at this time. Patient and/or      ld1 

      family updated on plan of care and expected duration. Pain level reassessed. Patient is     

      alert, oriented x 3, equal unlabored respirations, skin warm/dry/pink.                      

17:04 Reassessment: Patient appears in no apparent distress at this time. Patient and/or      ld1 

      family updated on plan of care and expected duration. Pain level reassessed. Patient is     

      alert, oriented x 3, equal unlabored respirations, skin warm/dry/pink.                      

17:04 Reassessment: Notified ERP of VS.                                                       ld1 

18:30 Reassessment: Patient appears in no apparent distress at this time. No changes from     ld1 

      previously documented assessment. Patient and/or family updated on plan of care and         

      expected duration. Pain level reassessed. Patient is alert, oriented x 3, equal             

      unlabored respirations, skin warm/dry/pink.                                                 

19:30 Reassessment: Patient appears in no apparent distress at this time. Patient is alert,   ld1 

      oriented x 3, equal unlabored respirations, skin warm/dry/pink.                             

20:24 Reassessment: Patient appears in no apparent distress at this time. No changes from     ld1 

      previously documented assessment. Patient is alert, oriented x 3, equal unlabored           

      respirations, skin warm/dry/pink.                                                           

                                                                                                  

Vital Signs:                                                                                      

15:48  / 90; Pulse 90; Resp 20; Pulse Ox 92% on 7 lpm NC;                               ld1 

16:52 Weight 75.3 kg;                                                                         ld1 

17:04  / 72; Pulse 89; Resp 18; Pulse Ox 83% on 3 lpm NC;                               ld1 

18:00  / 121; Pulse 87; Resp 18; Pulse Ox 89% on 6 lpm NC;                              ld1 

19:00  / 81; Pulse 85; Resp 18; Pulse Ox 93% on 7 lpm NC;                               ld1 

20:00  / 82; Pulse 84; Resp 18; Pulse Ox 93% on 7 lpm NC;                               ld1 

                                                                                                  

ED Course:                                                                                        

14:33 Patient arrived in ED.                                                                  ld1 

14:35 Triage completed.                                                                       ld1 

14:36 Elmer Urias NP is PHCP.                                                           pm1 

14:36 Emanuel Altman MD is Attending Physician.                                             pm1 

14:37 Arm band placed on right wrist.                                                         ld1 

14:39 Patient has correct armband on for positive identification. Placed in gown. Bed in low  ld1 

      position. Call light in reach. Side rails up X2. Cardiac monitor on. Pulse ox on. NIBP      

      on.                                                                                         

14:39 No provider procedures requiring assistance completed.                                  ld1 

14:50 Mariangel Joseph, AMPARO is Primary Nurse.                                                   ld1 

15:20 CXR XRAY In Process Unspecified.                                                        EDMS

17:49 Chinmay Sams DO is Hospitalizing Provider.                                           pm1 

                                                                                                  

Administered Medications:                                                                         

16:57 Drug: Lovenox (enoxaparin) 1 mg/kg Route: Sub-Q; Site: abdomen;                         ld1 

20:27 Follow up: Response: No adverse reaction                                                ld1 

17:15 Drug: DiFLUcan (fluconazole) 150 mg Route: PO;                                          ld1 

20:27 Follow up: Response: No adverse reaction                                                ld1 

17:15 Drug: Rocephin (cefTRIAXone) 1 grams Route: IV; Rate: calculated rate; Site: left       ld1 

      forearm;                                                                                    

20:26 Follow up: Response: No adverse reaction; IV Status: Completed infusion                 ld1 

17:15 Drug: NS 0.9% 500 ml Route: IV; Rate: bolus; Site: left forearm;                        ld1 

20:26 Follow up: Response: No adverse reaction; IV Status: Completed infusion                 ld1 

18:18 Drug: SOLU-Medrol (methylPrednisoLONE) 125 mg Route: IVP; Site: left forearm;           ld1 

20:26 Follow up: Response: No adverse reaction                                                ld1 

18:19 Drug: Tylenol 650 mg Route: PO;                                                         ld1 

20:26 Follow up: Response: No adverse reaction                                                ld1 

                                                                                                  

                                                                                                  

Outcome:                                                                                          

17:49 Decision to Hospitalize by Provider.                                                    pm1 

                                                                                             

05:46 Patient left the ED.                                                                    bb  

                                                                                                  

Signatures:                                                                                       

Dispatcher [employer]                           Rhina Gonzalez RN RN   bb                                                   

Elmer Urias NP                    NP   pm1                                                  

Mariangel Joseph RN                     RN   ld1                                                  

                                                                                                  

**************************************************************************************************

## 2021-07-06 NOTE — EDPHYS
Physician Documentation                                                                           

 [facility]                                                                 

Name: Vianey Ly                                                                            

Age: 76 yrs                                                                                       

Sex: Female                                                                                       

: 1945                                                                                   

MRN: [id number]                                                                                   

Arrival Date: 2021                                                                          

Time: 14:33                                                                                       

Account#: [id number]                                                                            

Bed 28                                                                                            

Private MD:                                                                                       

ED Physician Emanuel Altman                                                                      

HPI:                                                                                              

                                                                                             

14:51 This 76 yrs old  Female presents to ER via EMS with complaints of General      pm1 

      Weakness.                                                                                   

14:51 The patient presents to the emergency department with weakness of the entire body,      pm1 

      generalized weakness. Onset: The symptoms/episode began/occurred 1 week(s) ago.             

      Context: Possibly due to recent covid diagnosis. Associated signs and symptoms:             

      Pertinent positives: decreased PO intake of food and fluids for the past 2-3 days,          

      Pertinent negatives: fever, headache, nausea, vomiting, diarrhea, chest pain, shortness     

      of breath. Severity of symptoms: in the emergency department the symptoms are worse         

      Pain is currently a 0 / 10. The patient has been recently seen at the [facility], last week, for similar complaints              

      diagnosed with covid-19. Between last ER visit and today patient reports right arm          

      pain. Patient is uncertain of the day or specifics but she reports that arm pain is her     

      chest pain equivalent. Triple bypass 9 years ago with chest pain equivalent of arm pain     

      only.                                                                                       

                                                                                                  

Historical:                                                                                       

- Allergies:                                                                                      

14:37 No Known Allergies;                                                                     ld1 

- Home Meds:                                                                                      

14:35 Allopurinol Oral [Active]; Aspirin Oral [Active]; doxazosin Oral [Active];              ld1 

      Hydrochlorothiazide Oral [Active]; Imdur Oral [Active]; insulin glargine subcutaneous       

      Sub-Q [Active]; isosorbide dinitrate Oral [Active]; Lantus Sub-Q [Active];                  

      levothyroxine oral [Active]; liothyronine Oral [Active]; lisinopril Oral [Active];          

      Metformin Oral [Active]; metoprolol succinate Oral [Active]; pioglitazone Oral              

      [Active]; Plavix Oral [Active]; Simvastatin Oral [Active];                                  

- PMHx:                                                                                           

14:35 Diabetes - IDDM; Gout; Hyperlipidemia; Hypertension; Hypothyroidism; Myocardial         ld1 

      infarction;                                                                                 

                                                                                                  

- Immunization history:: Adult Immunizations up to date.                                          

- Social history:: Smoking status: unknown.                                                       

                                                                                                  

                                                                                                  

ROS:                                                                                              

14:51 Eyes: Negative for injury, pain, redness, and discharge, ENT: Negative for injury,      pm1 

      pain, and discharge, Neck: Negative for injury, pain, and swelling, Cardiovascular:         

      Negative for chest pain, palpitations, and edema, Respiratory: Negative for shortness       

      of breath, cough, wheezing, and pleuritic chest pain, Abdomen/GI: Negative for              

      abdominal pain, nausea, vomiting, diarrhea, and constipation, Back: Negative for injury     

      and pain, : Negative for injury, bleeding, discharge, and swelling, MS/Extremity:         

      Negative for injury and deformity, Skin: Negative for injury, rash, and discoloration.      

14:51 Constitutional: Positive for poor PO intake, Negative for body aches, fever.                

14:51 Neuro: Positive for weakness, generalized.                                                  

                                                                                                  

Exam:                                                                                             

14:51 Constitutional:  This is a well developed, well nourished patient who is awake, alert,  pm1 

      and in no acute distress. Head/Face:  Normocephalic, atraumatic.                            

14:51 Skin:  Warm, dry with normal turgor.  Normal color with no rashes, no lesions, and no       

      evidence of cellulitis. MS/ Extremity:  Pulses equal, no cyanosis.  Neurovascular           

      intact.  Full, normal range of motion.                                                      

14:51 Eyes: Exam is negative for acute changes, Extraocular movements: no acute changes,          

      Conjunctiva: no acute changes, no injection.                                                

14:51 ENT: Exam is negative for acute changes, External ear(s): are unremarkable, Ear             

      canal(s): are normal, TM's: are normal, Mouth: Lips: normal, Oral mucosa: normal, pink      

      and intact, moist.                                                                          

14:51 Chest/axilla: Inspection: normal, Palpation: no acute changes.                              

14:51 Cardiovascular: Rate: normal, Rhythm: regular, Pulses: no pulse deficits are                

      appreciated, Heart sounds: normal, normal S1and S2, Edema: is not appreciated.              

14:51 Respiratory: the patient does not display signs of respiratory distress,  Respirations:     

      normal, Breath sounds: are clear throughout.                                                

14:51 Abdomen/GI: Inspection: abdomen appears normal, Palpation: abdomen is soft and              

      non-tender, in all quadrants.                                                               

14:51 Neuro: Exam negative for acute changes, Orientation: is normal, Mentation: is normal,       

      Motor: is normal, moves all fours.                                                          

                                                                                                  

Vital Signs:                                                                                      

15:48  / 90; Pulse 90; Resp 20; Pulse Ox 92% on 7 lpm NC;                               ld1 

16:52 Weight 75.3 kg;                                                                         ld1 

17:04  / 72; Pulse 89; Resp 18; Pulse Ox 83% on 3 lpm NC;                               ld1 

18:00  / 121; Pulse 87; Resp 18; Pulse Ox 89% on 6 lpm NC;                              ld1 

19:00  / 81; Pulse 85; Resp 18; Pulse Ox 93% on 7 lpm NC;                               ld1 

20:00  / 82; Pulse 84; Resp 18; Pulse Ox 93% on 7 lpm NC;                               ld1 

                                                                                                  

MDM:                                                                                              

14:41 Patient medically screened.                                                             pm1 

17:30 Data reviewed: vital signs. Counseling: I had a detailed discussion with the patient    pm1 

      and/or guardian regarding: the historical points, exam findings, and any diagnostic         

      results supporting the discharge/admit diagnosis, the need for further work-up and          

      treatment in the hospital.                                                                  

17:35 Physician consultation: Mukesh Yarbrough MD regarding consult, patient's condition, would  pm1 

      like further tests performed, echocardiogram, would like medications started, Lovenox.      

17:45 Physician consultation: Ramesh CORNEJO was contacted at 17:45, in the emergency       pm1 

      department to see patient at 17:45.                                                         

                                                                                                  

                                                                                             

14:41 Order name: BMP                                                                         pm                                                                                             

14:41 Order name: Blood Culture Adult (2)                                                     pm                                                                                             

14:41 Order name: C-Reactive Protein                                                          pm1 

                                                                                             

14:41 Order name: CBC with Diff                                                               pm                                                                                             

14:41 Order name: Ferritin                                                                    pm                                                                                             

14:41 Order name: LFT's                                                                       pm                                                                                             

14:41 Order name: Lactate                                                                     pm                                                                                             

14:41 Order name: Lipase                                                                      pm                                                                                             

14:41 Order name: PT-INR; Complete Time: 16:19                                                pm                                                                                             

14:41 Order name: Procalcitonin; Complete Time: 16:45                                         pm                                                                                             

14:41 Order name: Ptt, Activated; Complete Time: 16:19                                        pm                                                                                             

14:41 Order name: Troponin (emerg Dept Use Only); Complete Time: 16:54                        pm                                                                                             

14:41 Order name: Urine Microscopic Only; Complete Time: 16:54                                pm                                                                                             

14:41 Order name: Basic Metabolic Panel; Complete Time: 16:54                                 EDMS

                                                                                             

14:41 Order name: CXR XRAY; Complete Time: 15:54                                              pm1 

                                                                                             

14:41 Order name: Blood Culture                                                               EDMS

                                                                                             

14:41 Order name: C-Reactive Protein; Complete Time: 16:54                                    EDMS

                                                                                             

14:41 Order name: CBC with Automated Diff; Complete Time: 16:46                               EDMS

                                                                                             

14:41 Order name: Ferritin; Complete Time: 16:54                                              EDMS

                                                                                             

14:41 Order name: Liver (Hepatic) Function; Complete Time: 16:54                              EDMS

                                                                                             

14:41 Order name: Lactate; Complete Time: 16:21                                               EDMS

                                                                                             

14:41 Order name: Lipase; Complete Time: 16:54                                                EDMS

                                                                                             

15:48 Order name: TSH; Complete Time: 16:45                                                   pm1 

                                                                                             

16:20 Order name: Urine Dipstick-Ancillary; Complete Time: 16:21                              EDMS

                                                                                             

16:45 Order name: CBC Smear Scan; Complete Time: 16:46                                        EDMS

                                                                                             

16:49 Order name: Urine Culture                                                               EDMS

                                                                                             

19:24 Order name: Lactate Sepsis 2 HR Follow-up; Complete Time: 19:34                         EDMS

                                                                                             

21:46 Order name: Glucose, Ancillary Testing; Complete Time: 00:11                            EDMS

                                                                                             

23:27 Order name: Troponin I; Complete Time: 00:11                                            EDMS

                                                                                             

14:41 Order name: EKG; Complete Time: 14:42                                                   pm                                                                                             

14:41 Order name: Cardiac monitoring; Complete Time: 14:51                                    pm                                                                                             

14:41 Order name: Droplet/Contact Precautions; Complete Time: 14:42                           pm                                                                                             

14:41 Order name: EKG - Nurse/Tech; Complete Time: 15:15                                      pm                                                                                             

14:41 Order name: IV Start; Complete Time: 15:15                                              pm                                                                                             

14:41 Order name: Labs collected and sent; Complete Time: 16:26                               pm                                                                                             

14:41 Order name: O2 Per Protocol; Complete Time: 14:42                                       pm                                                                                             

14:41 Order name: O2 Sat Monitoring; Complete Time: 14:42                                     pm                                                                                             

14:41 Order name: Urine Dipstick-Ancillary (obtain specimen); Complete Time: 16:26            pm1 

                                                                                                  

Administered Medications:                                                                         

16:57 Drug: Lovenox (enoxaparin) 1 mg/kg Route: Sub-Q; Site: abdomen;                         ld1 

20:27 Follow up: Response: No adverse reaction                                                ld1 

17:15 Drug: DiFLUcan (fluconazole) 150 mg Route: PO;                                          ld1 

20:27 Follow up: Response: No adverse reaction                                                ld1 

17:15 Drug: Rocephin (cefTRIAXone) 1 grams Route: IV; Rate: calculated rate; Site: left       ld1 

      forearm;                                                                                    

20:26 Follow up: Response: No adverse reaction; IV Status: Completed infusion                 ld1 

17:15 Drug: NS 0.9% 500 ml Route: IV; Rate: bolus; Site: left forearm;                        ld1 

20:26 Follow up: Response: No adverse reaction; IV Status: Completed infusion                 ld1 

18:18 Drug: SOLU-Medrol (methylPrednisoLONE) 125 mg Route: IVP; Site: left forearm;           ld1 

20:26 Follow up: Response: No adverse reaction                                                ld1 

18:19 Drug: Tylenol 650 mg Route: PO;                                                         ld1 

20:26 Follow up: Response: No adverse reaction                                                ld1 

                                                                                                  

                                                                                                  

Disposition:                                                                                      

                                                                                             

09:34 Co-signature as Attending Physician, Emanuel Altman MD I agree with the assessment and  macrina 

      plan of care.                                                                               

                                                                                                  

Disposition Summary:                                                                              

21 17:49                                                                                    

Hospitalization Ordered                                                                           

      Hospitalization Status: Inpatient Admission                                             pm1 

      Provider: Chinmay Sams                                                                pm1 

      Condition: Stable                                                                       pm1 

      Problem: new                                                                            pm1 

      Symptoms: have improved                                                                 pm1 

      Bed/Room Type: Standard                                                                 pm1 

      Location: Intensive Care Unit(21 05:12)                                           tl1 

      Room Assignment: 6-(21 05:12)                                                     tl1 

      Diagnosis                                                                                   

        - NSTEMI                                                                              pm1 

        - Pneumonia due to SARS-associated coronavirus                                        pm1 

        - UTI/ Urinary tract infection, site not specified                                    pm1 

        - Candidiasis, unspecified                                                            pm1 

      Forms:                                                                                      

        - Medication Reconciliation Form                                                      pm1 

        - SBAR form                                                                           pm1 

Signatures:                                                                                       

Dispatcher [employer]                           Lizett Camacho RN RN dw Anderson, Corey, MD MD cha Lasagna, Tonya, RN RN   tl1                                                  

Elmer Urias, OTTONIEL                    NP   pm1                                                  

Mariangel Joseph RN                     RN   ld1                                                  

                                                                                                  

Corrections: (The following items were deleted from the chart)                                    

                                                                                             

18:53 17:49 Telemetry/MedSurg (Inpatient) pm1                                                 dw  

18:53 17:49 pm1                                                                               dw  

                                                                                             

05:12  18:53 [facility] ER HOLD dw                                                             tl1 

                                                                                             

[facility]                                                                   [facility] 

                                                                                                  

**************************************************************************************************

## 2021-07-06 NOTE — P.HP
Certification for Inpatient


Patient admitted to: Inpatient


With expected LOS: >2 Midnights


Patient will require the following post-hospital care: None


Practitioner: I am a practitioner with admitting privileges, knowledge of 

patient current condition, hospital course, and medical plan of care.


Services: Services provided to patient in accordance with Admission requirements

found in Title 42 Section 412.3 of the Code of Federal Regulations





Patient History


Date of Service: 21


Primary Care Provider: none- previously Dr. montiel, nephrology Dr. Mckeon


Reason for admission: NSTEMI, hypoxemia, COVID pneumonia


History of Present Illness: 


76 old  female with history of diabetes mellitus type 2, hypertension, 

CKD, hyperlipidemia, CAD presents emergency department for generalized weakness.

 Patient reports testing positive for Corona virus approximately 1 week prior.  

Patient reports at home she has had shortness of breath, intermittent arm pain. 

Patient evaluated in the emergency department, labs significant for sodium 130 

chloride 97 creatinine 2.16 GFR 22 BUN 56 glucose 357 lactic acid 2.6 ferritin 

2465 troponin 0.58  pro calcitonin 0.18 white blood cell count within 

normal limits.  Chest x-ray suggest COVID pneumonia.  Patient also hypoxic on 

room air, saturating in the low 80s.  Cardiology was contacted while patient is 

in the emergency department, recommends Lovenox and echocardiogram.  ED provider

wishes to admit for NSTEMI, acute respiratory failure. 





Allergies





No Known Drug Allergies Allergy (Unverified 17 08:04)


   Unknown


No Known Allergies Allergy (Uncoded 17 21:50)


   Unknown





Home Medications: 








Aspirin [Aspirin EC 81 MG] 81 mg PO DAILY 07/04/15 


Cholecalciferol (Vitamin D3) [Vitamin D3] 2,000 unit PO DAILY 07/04/15 


Clopidogrel Bisulfate [Clopidogrel] 75 mg PO DAILY 07/04/15 


Insulin Glargine Human [Lantus*] 46 unit SQ BEDTIME 07/04/15 


Isosorbide Mononitrate [Isosorbide Mononitrate ER] 30 mg PO DAILY AFTER SUPPER 

07/04/15 


Levothyroxine [Synthroid*] 137 mcg PO DAILY AFTER SUPPER 07/04/15 


Liothyronine [Cytomel*] 5 mcg PO DAILY AFTER SUPPER 07/04/15 


Lisinopril/Hydrochlorothiazide [Zestoretic 20-12.5 mg Tablet] 1 each PO DAILY 

AFTER SUPPER 07/04/15 


Metformin HCl [Glucophage] 1,000 mg PO BID 07/04/15 


Metoprolol Succinate [Toprol Xl*] 50 mg PO DAILY AFTER SUPPER 07/04/15 


Omega3/Dha/Epa/Fish Oil/Vit D3 [Fish Oil + Vitamin D-3 Softgel] 1 each PO DAILY 

07/04/15 


Simvastatin [Zocor*] 40 mg PO BEDTIME 07/04/15 


Ubidecarenone/Vit E Acet [Co Q-10 100 mg Softgel] 1 each PO DAILY 07/04/15 


allopurinoL [Zyloprim*] 300 mg PO DAILY 07/04/15 


Fluticasone [Flonase 50mcg Nasal Spray] 2 sprays NS DAILY #1 btl 07/05/15 


Ciprofloxacin HCl [Cipro 500 MG Tablet] 500 mg PO BID #10 tab 17 


Codeine/APAP [Tylenol W/Codeine #3 tab] 1 tab PO Q4HP PRN #30 tab 17 


Loratadine/Pseudoephedrine [Claritin-D 24 Hour Tablet] 1 each PO DAILY AFTER 

SUPPER 17 








- Past Medical/Surgical History


Diabetic: Yes


-: IDDM


-: hyperlipidemia


-: MIx3


-: hypothyroidism


-: HTN


-: retinopathy


-: DVT in 


-: CABG 


-: laser sx on eyes


-: tonsilectomy


Psychosocial/ Personal History: Lives at home with son





- Family History


  ** Father


-: Hypertension, Other (see notes)


Notes:  of MI at 39yr old.





  ** Mother


-: Heart disease, Lung disease, Cancer, Other (see notes)


Notes: MI, DVT





- Social History


Alcohol use: Yes


CD- Drugs: No


Caffeine use: No


Place of Residence: Home





Review of Systems


10-point ROS is otherwise unremarkable


General: Weakness, Malaise


Respiratory: Cough, Shortness of Breath





Physical Examination





- Physical Exam


General: Alert, In no apparent distress, Oriented x3


HEENT: Atraumatic, PERRLA, Mucous membr. moist/pink


Neck: Supple, 2+ carotid pulse no bruit, No LAD


Respiratory: Clear to auscultation bilaterally, Normal air movement


Cardiovascular: Regular rate/rhythm, Normal S1 S2


Gastrointestinal: Normal bowel sounds, No tenderness


Musculoskeletal: No tenderness


Integumentary: No rashes


Neurological: Normal speech, Normal strength at 5/5 x4 extr, Normal tone, Normal

affect





- Studies


Laboratory Data (last 24 hrs)





21 15:38: PT 12.1, INR 1.05, APTT 24.4


21 15:38: WBC 9.50  D, Hgb 11.5 L, Hct 35.1 L, Plt Count 248  D


21 15:38: Sodium 130 L, Potassium 4.8, BUN 56 H D, Creatinine 2.16 H, 

Glucose 357 H, Total Bilirubin 0.6, AST 42 H, ALT 17, Alkaline Phosphatase 124 

H, Lipase 11 L








Assessment and Plan





- Plan


Assessment


NSTEMI with history of CAD status post CABG 


Acute hypoxic respiratory failure secondary to COVID pneumonia


FERMIN superimposed on CKD 3


Diabetes mellitus type 2 with hyperglycemia


UTI/fungal


Hypertension


Hyperlipidemia








Plan


NSTEMI with history of CAD status post CABG :  Trend troponin levels, 

monitor on telemetry, cardiology consult in place.  Continue with daily Lovenox,

aspirin, Plavix, statin.  Patient chest pain free at this time.  Will order 

echocardiogram, DVT prophylaxis with full-dose Lovenox at this time.


Acute hypoxic respiratory failure secondary to COVID pneumonia:  CRP 

significantly elevated around 210 will continue with steroids, supplemental 

oxygen, oral supplements.  Pulmonology will be consulted for additional 

assistance and management.  Patient requiring 5 L per nasal cannula at this time

saturating around 90%, will likely require BiPAP/high-flow oxygen therapy during

hospitalization.


FERMIN superimposed on CKD 3:  Patient appears dry, continue gentle hydration 

overnight, nephrology will be consulted.  Daily labs.  Will order renal 

ultrasound.


Diabetes mellitus type 2 with hyperglycemia:  A.c. HS Accu-Cheks, sliding scale 

insulin therapy.  A1c with morning labs.  Patient will be on steroids, will 

likely need to increase insulin.


UTI/fungal:  Continue Diflucan at this time, pending microscopic analysis, 

culture ordered.


Hypertension:  Obtain and continue home medications, adjust as necessary 

according to renal function.


Hyperlipidemia:  Continue home medications.





Code status


Discharge Plan: Home


Plan to discharge in: Greater than 2 days





- Advance Directives


Does patient have a Living Will: No


Does patient have a Durable POA for Healthcare: No





- Code Status/Comfort Care


Code Status Assessed: Yes (DNR)


Critical Care: No


Time Spent Managing Pts Care (In Minutes): 55

## 2021-07-07 LAB
ALBUMIN SERPL BCP-MCNC: 2.1 G/DL (ref 3.4–5)
ALP SERPL-CCNC: 124 U/L (ref 45–117)
ALT SERPL W P-5'-P-CCNC: 14 U/L (ref 12–78)
AST SERPL W P-5'-P-CCNC: 40 U/L (ref 15–37)
BUN BLD-MCNC: 50 MG/DL (ref 7–18)
CRP SERPL-MCNC: 138 MG/L (ref ?–3)
FERRITIN SERPL-MCNC: 1776.2 NG/ML (ref 8–388)
GLUCOSE SERPLBLD-MCNC: 348 MG/DL (ref 74–106)
HCT VFR BLD CALC: 34.4 % (ref 36–45)
HDLC SERPL-MCNC: 30 MG/DL (ref 40–60)
LDLC SERPL CALC-MCNC: 54 MG/DL (ref ?–130)
LYMPHOCYTES # SPEC AUTO: 0.5 K/UL (ref 0.7–4.9)
MORPHOLOGY BLD-IMP: (no result)
PMV BLD: 8.8 FL (ref 7.6–11.3)
POTASSIUM SERPL-SCNC: 4.9 MMOL/L (ref 3.5–5.1)
RBC # BLD: 3.72 M/UL (ref 3.86–4.86)
TROPONIN I: 1.63 NG/ML (ref 0–0.04)

## 2021-07-07 RX ADMIN — METOPROLOL TARTRATE SCH MG: 25 TABLET ORAL at 05:05

## 2021-07-07 RX ADMIN — ATORVASTATIN CALCIUM SCH MG: 20 TABLET, FILM COATED ORAL at 21:05

## 2021-07-07 RX ADMIN — Medication PRN MG: at 21:04

## 2021-07-07 RX ADMIN — HUMAN INSULIN SCH UNIT: 100 INJECTION, SOLUTION SUBCUTANEOUS at 16:50

## 2021-07-07 RX ADMIN — CLOPIDOGREL BISULFATE SCH MG: 75 TABLET, FILM COATED ORAL at 10:11

## 2021-07-07 RX ADMIN — Medication SCH MG: at 21:04

## 2021-07-07 RX ADMIN — Medication SCH MG: at 16:47

## 2021-07-07 RX ADMIN — METHYLPREDNISOLONE SODIUM SUCCINATE SCH MG: 40 INJECTION, POWDER, FOR SOLUTION INTRAMUSCULAR; INTRAVENOUS at 16:46

## 2021-07-07 RX ADMIN — METHYLPREDNISOLONE SODIUM SUCCINATE SCH MG: 40 INJECTION, POWDER, FOR SOLUTION INTRAMUSCULAR; INTRAVENOUS at 00:11

## 2021-07-07 RX ADMIN — HUMAN INSULIN SCH UNIT: 100 INJECTION, SOLUTION SUBCUTANEOUS at 21:05

## 2021-07-07 RX ADMIN — Medication SCH MG: at 10:10

## 2021-07-07 RX ADMIN — INSULIN GLARGINE SCH UNITS: 100 INJECTION, SOLUTION SUBCUTANEOUS at 21:05

## 2021-07-07 RX ADMIN — Medication SCH: at 09:00

## 2021-07-07 RX ADMIN — BENZONATATE PRN MG: 100 CAPSULE ORAL at 21:04

## 2021-07-07 RX ADMIN — ISOSORBIDE MONONITRATE SCH MG: 30 TABLET, EXTENDED RELEASE ORAL at 18:02

## 2021-07-07 RX ADMIN — ZINC SULFATE CAP 220 MG (50 MG ELEMENTAL ZN) SCH MG: 220 (50 ZN) CAP at 10:11

## 2021-07-07 RX ADMIN — ASPIRIN SCH MG: 81 TABLET, COATED ORAL at 10:10

## 2021-07-07 RX ADMIN — METOPROLOL TARTRATE SCH MG: 25 TABLET ORAL at 16:46

## 2021-07-07 RX ADMIN — HUMAN INSULIN SCH UNIT: 100 INJECTION, SOLUTION SUBCUTANEOUS at 10:11

## 2021-07-07 RX ADMIN — SODIUM CHLORIDE SCH MLS: 0.9 INJECTION, SOLUTION INTRAVENOUS at 13:22

## 2021-07-07 RX ADMIN — Medication SCH MG: at 13:19

## 2021-07-07 RX ADMIN — METHYLPREDNISOLONE SODIUM SUCCINATE SCH MG: 40 INJECTION, POWDER, FOR SOLUTION INTRAMUSCULAR; INTRAVENOUS at 10:11

## 2021-07-07 RX ADMIN — HUMAN INSULIN SCH UNIT: 100 INJECTION, SOLUTION SUBCUTANEOUS at 12:33

## 2021-07-07 RX ADMIN — IVERMECTIN SCH MG: 3 TABLET ORAL at 12:35

## 2021-07-07 RX ADMIN — Medication SCH MG: at 10:11

## 2021-07-07 RX ADMIN — APIXABAN SCH MG: 5 TABLET, FILM COATED ORAL at 21:04

## 2021-07-07 RX ADMIN — BARICITINIB SCH MG: 2 TABLET, FILM COATED ORAL at 13:00

## 2021-07-07 RX ADMIN — Medication SCH ML: at 21:06

## 2021-07-07 RX ADMIN — DOXAZOSIN SCH MG: 2 TABLET ORAL at 21:04

## 2021-07-07 NOTE — EKG
Test Date:    2021-07-06               Test Time:    15:09:17

Technician:   JENN                                   

                                                     

MEASUREMENT RESULTS:                                       

Intervals:                                           

Rate:         89                                     

OR:           138                                    

QRSD:         92                                     

QT:           378                                    

QTc:          459                                    

Axis:                                                

P:            76                                     

OR:           138                                    

QRS:          -23                                    

T:            91                                     

                                                     

INTERPRETIVE STATEMENTS:                                       

                                                     

Normal sinus rhythm

Low voltage QRS

Inferior infarct, age undetermined

Anterolateral infarct, age undetermined

Abnormal ECG

Compared to ECG 02/21/2017 08:31:52

Low QRS voltage now present

Myocardial infarct finding still present



Electronically Signed On 07-07-21 12:59:28 CDT by Mukesh Yarbrough

## 2021-07-07 NOTE — P.CNS
Date of Consult: 21


Reason for Consult: Respiratory failure from corona virus


Primary Care Provider: none- previously Dr. montiel, nephrology Dr. Mckeon


Chief Complaint: NSTEMI, hypoxemia, COVID pneumonia


History of Present Illness: 


Patient is 76 years of age metabolic syndrome chronic renal disease coronary 

artery disease the min short of breath sound to be hypoxic with corona virus 

pneumonia tested positive about a week ago with very comfortable on high-flow 

oxygen admitted with a non STEMI


Allergies





No Known Drug Allergies Allergy (Verified 21 22:40)


   Unknown





Home Medications: 








Aspirin [Aspirin EC 81 MG] 81 mg PO DAILY 07/04/15 


Cholecalciferol (Vitamin D3) [Vitamin D3] 2,000 unit PO DAILY 07/04/15 


Clopidogrel Bisulfate [Clopidogrel] 75 mg PO DAILY 07/04/15 


Insulin Glargine Human [Lantus*] 46 unit SQ BEDTIME 07/04/15 


Isosorbide Mononitrate [Isosorbide Mononitrate ER] 30 mg PO DAILY AFTER SUPPER 

07/04/15 


Levothyroxine [Synthroid*] 137 mcg PO DAILY AFTER SUPPER 07/04/15 


Liothyronine [Cytomel*] 5 mcg PO DAILY AFTER SUPPER 07/04/15 


Lisinopril/Hydrochlorothiazide [Zestoretic 20-12.5 mg Tablet] 1 each PO DAILY 

AFTER SUPPER 07/04/15 


Metformin HCl [Glucophage] 1,000 mg PO BID 07/04/15 


Metoprolol Succinate [Toprol Xl*] 50 mg PO DAILY AFTER SUPPER 07/04/15 


Omega3/Dha/Epa/Fish Oil/Vit D3 [Fish Oil + Vitamin D-3 Softgel] 1 each PO DAILY 

07/04/15 


Simvastatin [Zocor*] 40 mg PO BEDTIME 07/04/15 


Ubidecarenone/Vit E Acet [Co Q-10 100 mg Softgel] 1 each PO DAILY 07/04/15 


allopurinoL [Zyloprim*] 300 mg PO DAILY 07/04/15 


Fluticasone [Flonase 50mcg Nasal Spray] 2 sprays NS DAILY #1 btl 07/05/15 


Ciprofloxacin HCl [Cipro 500 MG Tablet] 500 mg PO BID #10 tab 17 


Codeine/APAP [Tylenol W/Codeine #3 tab] 1 tab PO Q4HP PRN #30 tab 17 


Loratadine/Pseudoephedrine [Claritin-D 24 Hour Tablet] 1 each PO DAILY AFTER 

SUPPER 17 








- Past Medical/Surgical History


Diabetic: Yes


-: IDDM


-: hyperlipidemia


-: MIx3


-: hypothyroidism


-: HTN


-: retinopathy


-: DVT in 


-: CHF


-: kidney disease, states "44% working"


-: CABG 


-: laser sx on eyes


-: tonsilectomy


Psychosocial/ Personal History: Lives at home with son





- Family History


  ** Father


Medical History: Hypertension, Other (see notes)


Notes:  of MI at 39yr old.





  ** Mother


Medical History: Heart disease, Lung disease, Cancer, Other (see notes)


Notes: MI, DVT





- Social History


Smoking Status: Unknown if ever smoked


Alcohol use: Yes


CD- Drugs: No


Caffeine use: No


Place of Residence: Home





Review of Systems


General: Weakness


Respiratory: Shortness of Breath





Physical Examination














Temp Pulse Resp BP Pulse Ox


 


 98.1 F   78   21 H  151/73 H  85 L


 


 21 05:55  21 06:00  21 06:00  21 06:00  21 06:00








Laboratory Data (last 24 hrs)





21 15:38: PT 12.1, INR 1.05, APTT 24.4


21 15:38: WBC 9.50  D, Hgb 11.5 L, Hct 35.1 L, Plt Count 248  D


21 15:38: Sodium 130 L, Potassium 4.8, BUN 56 H D, Creatinine 2.16 H, 

Glucose 357 H, Total Bilirubin 0.6, AST 42 H, ALT 17, Alkaline Phosphatase 124 

H, Lipase 11 L








- Problems


(1) Acute respiratory failure due to severe acute respiratory syndrome 

coronavirus 2 (SARS-CoV-2) infection


Current Visit: Yes   Status: Acute   


Plan: 


Patient is 76 years of age admitted with respiratory failure from corona virus 

she has mild interstitial changes acquiring high concentrations of oxygen 

otherwise she looks well patient has chronic renal failure troponins elevated 

mildly anemic urine culture shows 4+ gram-negative rods inflammatory markers are

significantly elevated I have added barcitinib continue with steroids of also 

added oral Eliquis renal function improving with IV fluids

## 2021-07-07 NOTE — ECHO
HEIGHT: 5 ft 0 in   WEIGHT: 166 lb 0 oz   DATE OF STUDY: 07/07/2021   REFER DR: Ramesh Mendosa NP

2-DIMENSIONAL: YES

     M.MODE: YES

 DOPPLER: YES

COLOR FLOW: YES



                    TDS:  YES

PORTABLE: YES

 DEFINITY:  NO

BUBBLE STUDY: NO





DIAGNOSIS:  NSTEMI



CARDIAC HISTORY:  

CATHERIZATION: YES

SURGERY: YES

PROSTHETIC VALVE: NO

PACEMAKER: NO





MEASUREMENTS (cm)

    DIASTOLIC (NORMALS)                 SYSTOLIC (NORMALS)

IVSd                 1.1 (0.6-1.2)                    LA Diam 4.3 (1.9-4.0)     LVEF       
  45%  

LVIDd               3.7 (3.5-5.7)                        LVIDs      3.0 (2.0-3.5)     %FS  
        20%

LVPWd             1.1 (0.6-1.2)

Ao Diam           2.4 (2.0-3.7)



2 DIMENSIONAL ASSESSMENT:

RIGHT ATRIUM:                   NORMAL

LEFT ATRIUM:       DILATED



RIGHT VENTRICLE:            NORMAL

LEFT VENTRICLE: NORMAL



TRICUSPID VALVE:             NORMAL

MITRAL VALVE:    MITRAL ANNULAR CALCIFICATION



PULMONIC VALVE:             NORMAL

AORTIC VALVE:     NORMAL



PERICARDIAL EFFUSION: NONE

AORTIC ROOT:      NORMAL





LEFT VENTRICULAR WALL MOTION:     MILD GLOBAL HYPOKINESIS.



DOPPLER/COLOR FLOW:     MILD TRICUSPID REGURGTATION. MILD AORTIC STENOSIS. AORTIC VALVEL 
AREA 1.7 CENTIMETERS SQUARED.



COMMENTS:      MILD TRICUSPID REGURGTATION. MILD PULMONARY HYPERTENSION 41 mmHg. 

MILD AORTIC STENOSIS. AORTIC VALVEL AREA 1.7 CENTIMETERS SQUARED. MILD GLOBAL HYPOKINESIS. 
LEFT VENTRICULAR EJECTION FRACTION 45%.



TECHNOLOGIST:   DAMARIS SUAREZ Initial azathioprine (Imuran) consult completed on 19. Azathioprine 50 mg will be shipped on 19 for patient to receive 19. $3.40 copay. Patient intends to start azathioprine on . Confirmed 2 patient identifiers - name and . Therapy Appropriate.      Patient was counseled on the administration directions:  -Take 1 tablet (50mg) by mouth once daily   -Taking after meals may decrease gastrointestinal side effects      Patient was counseled on the following possible side effects, which include (but are not limited to):   · Gastrointestinal: Nausea and vomiting (rheumatoid arthritis: 12%), diarrhea  · Hematologic & oncologic: Leukopenia (renal transplant: >50%; rheumatoid arthritis: 28%), neoplasia (renal transplant 3% [other than lymphoma], 0.5% [lymphoma]), thrombocytopenia  · Hepatic: Hepatotoxicity, increased serum alkaline phosphatase, increased serum bilirubin, increased serum transaminases  · Infection: Increased susceptibility to infection     DDIs:  Medication list reviewed.    Lab Monitoring: expect weekly lab monitoring for the first month of therapy.    Patient confirmed understanding. Consultation included: indication; goals of treatment; administration; storage and handling; side effects; how to handle side effects; the importance of compliance; how to handle missed doses; the importance of laboratory monitoring; the importance of keeping all follow up appointments.Patient understands to report any medication changes to [facility] and provider.  All questions answered and addressed to patients satisfaction. I will f/u with patient in 1 week from start, [facility] to contact patient in 3 weeks for refills.     Janet Gill, PharmD  [facility]  [phone]

## 2021-07-07 NOTE — P.CNS
Date of Consult: 21


Reason for Consult: FERMIN/ CKD


Requesting Physician: Chinmay Sams


Primary Care Provider: none- previously Dr. montiel, nephrology Dr. Mckeon


Chief Complaint: NSTEMI, hypoxemia, COVID pneumonia


History of Present Illness: 





76 old  female with history of diabetes mellitus type 2, hypertension, 

CKD, hyperlipidemia, CAD presents emergency department for generalized weakness.

 Patient reports testing positive for Corona virus approximately 1 week prior.  

Patient reports at home she has had shortness of breath, intermittent arm pain. 

Patient evaluated in the emergency department, labs significant for sodium 130 

chloride 97 creatinine 2.16 GFR 22 BUN 56 glucose 357 lactic acid 2.6 ferritin 

2465 troponin 0.58  pro calcitonin 0.18 white blood cell count within 

normal limits.  Chest x-ray suggest COVID pneumonia.  Patient also hypoxic on 

room air, saturating in the low 80s.  Cardiology was contacted while patient is 

in the emergency department, recommends Lovenox and echocardiogram.  ED provider

wishes to admit for NSTEMI, acute respiratory failure. 





14:51 This 76 yrs old  Female presents to ER via EMS with complaints of

General      pm1 


      Weakness.                                                                 

                 


14:51 The patient presents to the emergency department with weakness of the 

entire body,      pm1 


      generalized weakness. Onset: The symptoms/episode began/occurred 1 week(s)

ago.             


      Context: Possibly due to recent covid diagnosis. Associated signs and 

symptoms:             


      Pertinent positives: decreased PO intake of food and fluids for the past 

2-3 days,          


      Pertinent negatives: fever, headache, nausea, vomiting, diarrhea, chest 

pain, shortness     


      of breath. Severity of symptoms: in the emergency department the symptoms 

are worse         


      Pain is currently a 0 / 10. The patient has been recently seen at the 

[facility] Emergency Department, last week, for similar 

complaints              


      diagnosed with covid-19. Between last ER visit and today patient reports 

right arm          


      pain. Patient is uncertain of the day or specifics but she reports that 

arm pain is her     


      chest pain equivalent. Triple bypass 9 years ago with chest pain 

equivalent of arm pain     


      only.                                                                     

                 


Allergies





No Known Drug Allergies Allergy (Verified 21 22:40)


   Unknown





Home medications list reviewed: Yes


Home Medications: 








Aspirin [Aspirin EC 81 MG] 81 mg PO DAILY 07/04/15 


Cholecalciferol (Vitamin D3) [Vitamin D3] 2,000 unit PO DAILY 07/04/15 


Clopidogrel Bisulfate [Clopidogrel] 75 mg PO DAILY 07/04/15 


Insulin Glargine Human [Lantus*] 20 unit SQ BEDTIME 07/04/15 


Isosorbide Mononitrate [Isosorbide Mononitrate ER] 30 mg PO DAILY AFTER SUPPER 

07/04/15 


Levothyroxine [Synthroid*] 137 mcg PO DAILY AFTER SUPPER 07/04/15 


Liothyronine [Cytomel*] 5 mcg PO DAILY AFTER SUPPER 07/04/15 


Metoprolol Succinate [Toprol Xl*] 50 mg PO DAILY AFTER SUPPER 07/04/15 


Simvastatin [Zocor*] 40 mg PO BEDTIME 07/04/15 


allopurinoL [Zyloprim*] 300 mg PO DAILY 07/04/15 


Doxazosin [Cardura*] 2 mg PO BID 21 


Insulin -Regular Human [Novolin -R*] See Protocol SQ ACHS 21 


Metformin HCl [Glucophage*] 1 tab PO DAILY 21 


Pioglitazone [Actos*] 1 tab PO DAILY 21 


hydroCHLOROthiazide [Hydrochlorothiazide] 25 mg PO DAILY 21 








- Past Medical/Surgical History


Diabetic: Yes


-: IDDM


-: hyperlipidemia


-: MIx3


-: hypothyroidism


-: HTN


-: retinopathy


-: DVT in 


-: CHF


-: kidney disease, states "44% working"


-: CABG 


-: laser sx on eyes


-: tonsilectomy


Psychosocial/ Personal History: Lives at home with son





- Family History


  ** Father


Medical History: Hypertension, Other (see notes)


Notes:  of MI at 39yr old.





  ** Mother


Medical History: Heart disease, Lung disease, Cancer, Other (see notes)


Notes: MI, DVT





- Social History


Smoking Status: Unknown if ever smoked


Alcohol use: Yes


CD- Drugs: No


Caffeine use: No


Place of Residence: Home





Review of Systems


General: Weakness, Malaise


Respiratory: Shortness of Breath


Neurological: Weakness





Physical Examination














Temp Pulse Resp BP Pulse Ox


 


 98.1 F   78   21 H  151/73 H  85 L


 


 21 05:55  21 06:00  21 06:00  21 06:00  21 06:00








General: Oriented x3, Cooperative


HEENT: Atraumatic


Neck: Supple


Respiratory: Diminished


Cardiovascular: No edema, Regular rate/rhythm


Gastrointestinal: Soft and benign, Non-distended


Musculoskeletal: No clubbing, No contractures


Integumentary: No rashes, No cyanosis


Neurological: Normal speech


Laboratory Data (last 24 hrs)





21 15:38: PT 12.1, INR 1.05, APTT 24.4


21 15:38: WBC 9.50  D, Hgb 11.5 L, Hct 35.1 L, Plt Count 248  D


21 15:38: Sodium 130 L, Potassium 4.8, BUN 56 H D, Creatinine 2.16 H, 

Glucose 357 H, Total Bilirubin 0.6, AST 42 H, ALT 17, Alkaline Phosphatase 124 

H, Lipase 11 L





Imagings Data: 


EXAM DESCRIPTION:  RAD - Chest Single View - 2021 3:20 pm


CLINICAL HISTORY:  Weakness, COVID positive


COMPARISON:  


TECHNIQUE:  AP portable chest image was obtained 2021 3:20 pm .


FINDINGS:  Lung volumes are low. Interstitial opacification is evident similar 

to comparison. No dense consolidation. There are some scattered alveolar 

opacities. Pattern is not specific but can be seen with a mild COVID-19 

pneumonia.


Sternotomy wires are in place. Trachea is midline. Heart and vasculature are 

normal. No measurable pleural effusion and no pneumothorax. No acute bony 

abnormality seen. No acute aortic findings suspected.


IMPRESSION:  Interstitial and patchy alveolar opacities are present.


Pattern is nonspecific but could indicate a mild COVID-19 pneumonia.


Conclusions/Impression: 


FERMIN likely due to hypovolemia


CKD III with proteinuria


-No NSAIDs


-Continue IVF with NS





Hyponatremia


-Continue IVF with NS


-Hold HCTZ





Hypocalcemia


-Continue Vitamin D3





HTN with CKD


-Continue Metoprolol





DM II with CKD


-RISS


-Hold Metformin





Moderate malnutrition


-Encourage nutrition





Anemia in chronic illness


-Monitor H&H





Acute GNR Cystitis


-Follow up culture


-Continue Rocephin





COVID-19 PNA


-Continue steroids


-Agree with anticoagulation


-Continue Oxygen supplementation





Thank you kindly for the consultation.


Case reviewed with Dr. Sams


Greater than 30min.

## 2021-07-07 NOTE — P.PN
Subjective


Date of Service: 07/07/21


Primary Care Provider: none- previously Dr. montiel, nephrology Dr. Mckeon


Chief Complaint: NSTEMI, hypoxemia, COVID pneumonia


Subjective: Other (She reports improvement.  Currently on nonrebreather.)





Physical Examination





- Vital Signs


Temperature: 98.1 F


Blood Pressure: 151/67


Pulse: 84


Respirations: 19


Pulse Ox (%): 91





- Studies


Laboratory Data (last 24 hrs)





07/06/21 15:38: PT 12.1, INR 1.05, APTT 24.4


07/06/21 15:38: WBC 9.50  D, Hgb 11.5 L, Hct 35.1 L, Plt Count 248  D


07/06/21 15:38: Sodium 130 L, Potassium 4.8, BUN 56 H D, Creatinine 2.16 H, 

Glucose 357 H, Total Bilirubin 0.6, AST 42 H, ALT 17, Alkaline Phosphatase 124 

H, Lipase 11 L








Assessment & Plan


Discharge Plan: Home


Plan to discharge in: Greater than 2 days


Physician Review Additional Text: 





Physical Exam:


General: Patient alert, cooperative.


HEENT: Atraumatic, PERRLA, Mucous membr. moist/pink


Neck: Supple, 2+ carotid pulse no bruit, No LAD


Respiratory: Patient does not appear labored.  Currently on nonrebreather.


Cardiovascular: Regular rate/rhythm, Normal S1 S2


Gastrointestinal: Normal bowel sounds, No tenderness


Musculoskeletal: No tenderness


Integumentary: No rashes


Neurological: Normal speech, Normal strength at 5/5 x4 extr, Normal tone, Normal

affect





Impression:


NSTEMI with history of CAD status post CABG 1996


Acute hypoxic respiratory failure secondary to COVID pneumonia


FERMIN superimposed on CKD 3


Diabetes mellitus type 2 with hyperglycemia


UTI/fungal


Hypertension


Hyperlipidemia








Plan


NSTEMI with history of CAD status post CABG 1996: Continue to trend troponin.  

Case discussed with cardiology.  No intervention is recommended at this time due

to her acute respiratory failure and Covid infection.  Will monitor closely..


Acute hypoxic respiratory failure secondary to COVID pneumonia: Pulmonology 

recommends to continue IV steroids.  Continue with supplementation.  Pulmonology

has adjusted DVT prophylaxis to Eliquis.  Wean off oxygen.  Will monitor 

closely.  Recheck chest x-ray tomorrow. 


FERMIN superimposed on CKD 3: Continue IV fluids.  Renal ultrasound to be obtained.

 Continue with nephrology recommendation.


Diabetes mellitus type 2 with hyperglycemia: Continue Accu-Cheks.  Will monitor 

closely.  Will make adjustments appropriately.


UTI/fungal: Cultures obtained.  Continue Rocephin.  Patient given 1 dose of 

Diflucan.


Hypertension: Continue medication


Hyperlipidemia:  Continue home medications.





Code status


DNR





DVT prophylaxis:


Eliquis





Advanced Care planning-30 min:


Home at DC





Time Spent Managing Pts Care (In Minutes): 55

## 2021-07-08 LAB
ALBUMIN SERPL BCP-MCNC: 1.9 G/DL (ref 3.4–5)
ALP SERPL-CCNC: 112 U/L (ref 45–117)
ALT SERPL W P-5'-P-CCNC: 12 U/L (ref 12–78)
AST SERPL W P-5'-P-CCNC: 39 U/L (ref 15–37)
BUN BLD-MCNC: 43 MG/DL (ref 7–18)
CRP SERPL-MCNC: 67.5 MG/L (ref ?–3)
FERRITIN SERPL-MCNC: 1226.8 NG/ML (ref 8–388)
GLUCOSE SERPLBLD-MCNC: 126 MG/DL (ref 74–106)
HCT VFR BLD CALC: 30.6 % (ref 36–45)
LYMPHOCYTES # SPEC AUTO: 1 K/UL (ref 0.7–4.9)
MAGNESIUM SERPL-MCNC: 1.9 MG/DL (ref 1.8–2.4)
PMV BLD: 8.8 FL (ref 7.6–11.3)
POTASSIUM SERPL-SCNC: 4 MMOL/L (ref 3.5–5.1)
RBC # BLD: 3.33 M/UL (ref 3.86–4.86)
TROPONIN I: 0.57 NG/ML (ref 0–0.04)

## 2021-07-08 RX ADMIN — METOPROLOL TARTRATE SCH: 25 TABLET ORAL at 05:58

## 2021-07-08 RX ADMIN — HUMAN INSULIN SCH UNIT: 100 INJECTION, SOLUTION SUBCUTANEOUS at 22:36

## 2021-07-08 RX ADMIN — DOXAZOSIN SCH MG: 2 TABLET ORAL at 22:35

## 2021-07-08 RX ADMIN — Medication SCH: at 09:00

## 2021-07-08 RX ADMIN — METHYLPREDNISOLONE SODIUM SUCCINATE SCH MG: 40 INJECTION, POWDER, FOR SOLUTION INTRAMUSCULAR; INTRAVENOUS at 16:55

## 2021-07-08 RX ADMIN — Medication SCH MG: at 09:05

## 2021-07-08 RX ADMIN — APIXABAN SCH MG: 5 TABLET, FILM COATED ORAL at 09:04

## 2021-07-08 RX ADMIN — SODIUM CHLORIDE SCH MLS: 0.9 INJECTION, SOLUTION INTRAVENOUS at 17:09

## 2021-07-08 RX ADMIN — ATORVASTATIN CALCIUM SCH MG: 20 TABLET, FILM COATED ORAL at 22:35

## 2021-07-08 RX ADMIN — SODIUM CHLORIDE SCH MLS: 0.45 INJECTION, SOLUTION INTRAVENOUS at 22:34

## 2021-07-08 RX ADMIN — Medication PRN MG: at 22:35

## 2021-07-08 RX ADMIN — METHYLPREDNISOLONE SODIUM SUCCINATE SCH MG: 40 INJECTION, POWDER, FOR SOLUTION INTRAMUSCULAR; INTRAVENOUS at 09:05

## 2021-07-08 RX ADMIN — LIOTHYRONINE SODIUM SCH MCG: 5 TABLET ORAL at 05:58

## 2021-07-08 RX ADMIN — ZINC SULFATE CAP 220 MG (50 MG ELEMENTAL ZN) SCH MG: 220 (50 ZN) CAP at 09:04

## 2021-07-08 RX ADMIN — CLOPIDOGREL BISULFATE SCH MG: 75 TABLET, FILM COATED ORAL at 09:05

## 2021-07-08 RX ADMIN — INSULIN GLARGINE SCH UNITS: 100 INJECTION, SOLUTION SUBCUTANEOUS at 22:36

## 2021-07-08 RX ADMIN — LEVOTHYROXINE SODIUM SCH MG: 112 TABLET ORAL at 05:58

## 2021-07-08 RX ADMIN — Medication SCH: at 21:00

## 2021-07-08 RX ADMIN — LEVOTHYROXINE SODIUM SCH MG: 25 TABLET ORAL at 05:58

## 2021-07-08 RX ADMIN — HUMAN INSULIN SCH UNIT: 100 INJECTION, SOLUTION SUBCUTANEOUS at 17:09

## 2021-07-08 RX ADMIN — SODIUM CHLORIDE SCH MLS: 9 INJECTION, SOLUTION INTRAVENOUS at 17:11

## 2021-07-08 RX ADMIN — HUMAN INSULIN SCH: 100 INJECTION, SOLUTION SUBCUTANEOUS at 07:30

## 2021-07-08 RX ADMIN — CHOLECALCIFEROL TAB 25 MCG (1000 UNIT) SCH UNIT: 25 TAB at 09:04

## 2021-07-08 RX ADMIN — METOPROLOL TARTRATE SCH MG: 25 TABLET ORAL at 17:11

## 2021-07-08 RX ADMIN — Medication SCH MG: at 17:11

## 2021-07-08 RX ADMIN — Medication SCH MG: at 13:31

## 2021-07-08 RX ADMIN — HUMAN INSULIN SCH UNIT: 100 INJECTION, SOLUTION SUBCUTANEOUS at 11:56

## 2021-07-08 RX ADMIN — BENZONATATE PRN MG: 100 CAPSULE ORAL at 06:03

## 2021-07-08 RX ADMIN — SODIUM CHLORIDE SCH MLS: 0.9 INJECTION, SOLUTION INTRAVENOUS at 04:09

## 2021-07-08 RX ADMIN — BARICITINIB SCH MG: 2 TABLET, FILM COATED ORAL at 09:00

## 2021-07-08 RX ADMIN — METHYLPREDNISOLONE SODIUM SUCCINATE SCH MG: 40 INJECTION, POWDER, FOR SOLUTION INTRAMUSCULAR; INTRAVENOUS at 00:24

## 2021-07-08 RX ADMIN — ASPIRIN SCH MG: 81 TABLET, COATED ORAL at 09:04

## 2021-07-08 RX ADMIN — Medication SCH MG: at 22:35

## 2021-07-08 RX ADMIN — ISOSORBIDE MONONITRATE SCH MG: 30 TABLET, EXTENDED RELEASE ORAL at 16:56

## 2021-07-08 RX ADMIN — DOXAZOSIN SCH MG: 2 TABLET ORAL at 09:05

## 2021-07-08 RX ADMIN — APIXABAN SCH MG: 5 TABLET, FILM COATED ORAL at 22:35

## 2021-07-08 NOTE — PN
Date of Progress Note:  07/08/2021



Ms. Ly is a patient who came in with COVID pneumonia.  She has a history of chronic systolic c
ongestive heart failure, coronary artery bypass surgery, hypertension, dyslipidemia, diabetes.  She d
o not resuscitate.  Echocardiogram which was done on 07/07/2021, revealed an ejection fraction of 45%
.  She had mild aortic stenosis with a 1.7 cm2, mild global hypokinesis.  She had mild pulmonary hype
rtension of 41 mmHg.  I would continue Ms. Ly's present medical regimen.  Dr. Mckeon, is foll
owing her for her renal insufficiency.  Dr. Le is following her for COVID pneumonia.  No plan f
or coronary intervention.  She is presently on Eliquis, aspirin, Lipitor, Plavix, insulin and/or ster
oid, metoprolol and antibiotics as well as Zinc and thiamine, and she was also on __________.  I do n
ot think she needs to have a combination of 3 anticoagulants.  I would prefer that we would stop her 
Plavix at this point and continue aspirin and Eliquis.  I will discuss the case further with fadia
 physicians.  I will continue to follow her as needed.





NB/JOANN

DD:  07/08/2021 05:03:05Voice ID:  [id number]

DT:  07/08/2021 06:08:45Report ID:  [id number]

## 2021-07-08 NOTE — P.PN
Subjective


Date of Service: 07/08/21


Primary Care Provider: none- previously Dr. montiel, nephrology Dr. Mckeon


Chief Complaint: NSTEMI, hypoxemia, COVID pneumonia


Subjective: Improving, Doing well





Physical Examination





- Vital Signs


Temperature: 96.6 F


Blood Pressure: 138/49


Pulse: 55


Respirations: 12


Pulse Ox (%): 96





Assessment & Plan


Discharge Plan: Home


Plan to discharge in: Greater than 2 days


Physician Review Additional Text: 





Physical Exam:


General: Patient alert, cooperative.


HEENT: Atraumatic, PERRLA, Mucous membr. moist/pink


Neck: Supple, 2+ carotid pulse no bruit, No LAD


Respiratory: Patient currently on 75% high flow


Cardiovascular: Regular rate/rhythm, Normal S1 S2


Gastrointestinal: Normal bowel sounds, No tenderness


Musculoskeletal: No tenderness


Integumentary: No rashes


Neurological: Normal speech, Normal strength at 5/5 x4 extr, Normal tone, Normal

affect





Impression:


NSTEMI with history of CAD status post CABG 1996


Acute hypoxic respiratory failure secondary to COVID pneumonia


FERMIN superimposed on CKD 3


Diabetes mellitus type 2 with hyperglycemia


UTI/fungal with urine culture positive for Klebsiella and Proteus


Hypertension


Hyperlipidemia








Plan


NSTEMI with history of CAD status post CABG 1996: Continue to trend troponin.  

Case discussed with cardiology.  Cardiology plans for no cardiac intervention 

due to her Covid infection.  Continue Plavix, Imdur, metoprolol, Lipitor.  

Continue to treat Covid infection at this time. 


Acute hypoxic respiratory failure secondary to COVID pneumonia: Continue IV 

Solu-Medrol and vitamin supplementation.  Patient also receiving Baricitinib.  

Continue to monitor liver function.  Continue to wean off high flow.  Encourage 

proning.  Encourage incentive spirometer.  Pulmonology has adjusted DVT 

prophylaxis to Eliquis.   Recheck chest x-ray tomorrow. 


FERMIN superimposed on CKD 3: Continue IV fluids until she is taking good oral 

intake.  Renal ultrasound to be obtained.  Continue with nephrology 

recommendation.


Diabetes mellitus type 2 with hyperglycemia: Continue Accu-Cheks.  Will monitor 

closely.  Continue Lantus.  Will make adjustments appropriately.


UTI/fungal with urine culture positive for Klebsiella and Proteus: Cultures 

obtained.  Continue Rocephin.  Patient given 1 dose of Diflucan.  Will 

transition to oral medication once significantly improved.


Hypertension: Continue medicationCardura and metoprolol.


Hyperlipidemia:  Continue home medicationLipitor.





Code status


DNR





DVT prophylaxis:


Eliquis





Advanced Care planning-30 min:


Home at DC





Time Spent Managing Pts Care (In Minutes): 55

## 2021-07-08 NOTE — CON
Date of Consultation:  07/07/2021



Admitted by Dr. Sams on 07/06/2021.



Reason For Consultation:  Elevated troponin.



History Of Present Illness:  Ms. Ly is a 76-year-old woman, has had a history of CABG in the p
ast, diabetes, hypertension, dyslipidemia, and gout as well as hypothyroidism.  Came in complaining o
f generalized weakness.  Denied any chest pain, denied any nausea, vomiting, diaphoresis, PND, orthop
nicolas, pedal edema, palpitation, or syncope.  Was recently diagnosed with COVID.  Has had poor p.o. int
[place] for the last 2-3 days.  Denied any fever or chills, chest pain, nausea, vomiting, or diarrhea.  H
ad CABG nine years ago.



Allergies:  NONE.



Review of Systems:

Negative.



Social History:  Negative.



Family History:  Noncontributory.



Medications:  Include allopurinol, doxazosin, hydrochlorothiazide, insulin, isosorbide, levothyroxine
, lisinopril.  She is also on the metoprolol, metformin, Plavix, simvastatin, and pioglitazone.



Physical Examination:

Vital Signs:  Stable, she is afebrile. 

HEENT:  Negative. 

Neck:  Supple, no bruits. 

Chest:  Clear to auscultation and percussion. 

Cardiac:  Revealed a regular rhythm and rate with an aortic sclerosis murmur.  She also had a 1/6 ear
ly diastolic murmur at the apex that did not radiate, that she did not have any gallops or rubs. 

Abdomen:  Benign. 

Extremities:  Revealed no clubbing, cyanosis, or edema. 

Neurologic:  She was nonfocal. 

Skin:  Dry and intact. 

Extremities:  Pulses were present distally bilaterally.



Diagnostic Data:  Chest x-ray showed mild COVID pneumonia.  EKG shows low voltage with old inferior M
I.  Creatinine was 2.16, hemoglobin was 11.5.  Her sodium was 130.  Troponin is 0.58.  Ferritin was 2
465.  Glucose was 357.  Procalcitonin was 0.18.  TSH was 0.099.



Impression And Plan:  Shortness of breath with elevated troponin, elevated ferritin, not consistent w
ith acute coronary syndrome.  I think this is all COVID related.  I would continue treating her COVID
.  I would get a 2D echocardiogram to rule out cardiomyopathy.  Definitely no plan for catheterizatio
n at this point.  She has had a history of CABG, and she is on appropriate medical therapy including 
metoprolol, aspirin, Plavix, simvastatin, and I will continue those.  Her diabetes is poorly controll
ed.  Her blood pressure is well controlled.  Her dyslipidemia is well controlled.  Her hypothyroidism
 needs to be addressed.  I think she needs to have a much lower thyroid dose.  I would like to get a 
2D echocardiogram to rule out any cardiomyopathy.  Mrs. Ly is a do not resuscitate.  Continue 
present regimen.  We will see what the echocardiogram shows.  We will continue to follow as needed.





NB/JOANN

DD:  07/08/2021 04:59:36Voice ID:  [id number]

DT:  07/08/2021 07:03:42Report ID:  [id number]

## 2021-07-08 NOTE — P.PN
Date of Service: 07/08/21


Vital Signs











Temp Pulse Resp BP Pulse Ox


 


 96.6 F L  87   12   172/55 H  96 


 


 07/08/21 05:57  07/08/21 09:05  07/08/21 05:57  07/08/21 09:05  07/08/21 05:57





Medications





Acetaminophen (Acetaminophen 500 Mg Tab)  500 mg PO Q4HP PRN


   PRN Reason: Pain scale 2-4 (Mild)


Allopurinol (Allopurinol 300 Mg Tab)  300 mg PO DAILY [facility]


   Last Admin: 07/08/21 09:05 Dose:  300 mg


   Documented by: 


Apixaban (Apixaban 5 Mg Tablet)  5 mg PO BID [facility]


   Last Admin: 07/08/21 09:04 Dose:  5 mg


   Documented by: 


Ascorbic Acid (Ascorbic Acid 500 Mg Tablet)  500 mg PO QID [facility]


   Last Admin: 07/08/21 09:05 Dose:  500 mg


   Documented by: 


Aspirin (Aspirin Ec 81 Mg Tab)  81 mg PO DAILY [facility]


   Last Admin: 07/08/21 09:04 Dose:  81 mg


   Documented by: 


Atorvastatin Calcium (Atorvastatin 20 Mg Tab)  20 mg PO BEDTIME [facility]


   Last Admin: 07/07/21 21:05 Dose:  20 mg


   Documented by: 


Benzonatate (Benzonatate 100 Mg Cap)  100 mg PO TID PRN


   PRN Reason: COUGH


   Last Admin: 07/08/21 06:03 Dose:  100 mg


   Documented by: 


Cholecalciferol (Vitamin D 1000 Unit Tab)  2,000 unit PO DAILY [facility]


   Last Admin: 07/08/21 09:04 Dose:  2,000 unit


   Documented by: 


Clopidogrel Bisulfate (Clopidogrel 75 Mg Tablet)  75 mg PO DAILY [facility]


   Last Admin: 07/08/21 09:05 Dose:  75 mg


   Documented by: 


Clopidogrel Bisulfate (Clopidogrel 75 Mg Tablet)  75 mg PO DAILY [facility]


   Last Admin: 07/08/21 09:00 Dose:  Not Given


   Documented by: 


Dextrose (D50w 25 Gm/50 Ml Vial)  12.5 gm IV PRN PRN; Protocol


   PRN Reason: HYPOGLYCEMIA


Doxazosin Mesylate (Doxazosin 2 Mg Tab)  2 mg PO BID [facility]


   Last Admin: 07/08/21 09:05 Dose:  2 mg


   Documented by: 


Fluconazole (Fluconazole 100 Mg Tab)  150 mg PO ONCE ONE; Protocol


   Stop: 07/09/21 16:01


Glucagon (Glucagon 1 Mg/Vial)  1 mg IM 1X PRN; Protocol


   PRN Reason: HYPOGLYCEMIA


Sodium Chloride (Ns 1000 Ml Ivbag)  1,000 mls @ 75 mls/hr IV .[id number] [facility]


   Last Admin: 07/08/21 04:09 Dose:  1,000 mls


   Documented by: 


Ceftriaxone Sodium/Sodium Chloride (Rocephin 1 Gm/10 Ml Swi Ivp)  1 gm in 10 mls

@ 600 mls/hr IV Q24H [facility]


   Last Admin: 07/07/21 17:52 Dose:  10 mls


   Documented by: 


Insulin Glargine (Insulin Glargine 100 Units/Ml)  20 units SQ BEDTIME [facility]


   Last Admin: 07/07/21 21:05 Dose:  20 units


   Documented by: 


Insulin Human Regular (Insulin -Regular Human 50 Unit/0.5 Ml Ml)  0 unit SQ ACHS

[facility]; Protocol


   Last Admin: 07/08/21 07:30 Dose:  Not Given


   Documented by: 


Isosorbide Mononitrate (Isosorbide Mono Sr 30 Mg Tab)  30 mg PO DAILY AFTER 

SUPPER [facility]


   Last Admin: 07/07/21 18:02 Dose:  30 mg


   Documented by: 


Ivermectin (Ivermectin 3 Mg Tablet)  18 mg PO Q48H [facility]


   Stop: 07/09/21 12:01


   Last Admin: 07/07/21 12:35 Dose:  18 mg


   Documented by: 


Levothyroxine Sodium (Levothyroxine Sod 0.112 Mg Tab)  0.112 mg PO DAILYAC [facility]


   Last Admin: 07/08/21 05:58 Dose:  0.112 mg


   Documented by: 


Levothyroxine Sodium (Levothyroxine Sod 0.025 Mg Tab)  0.025 mg PO DAILYAC [facility]


   Last Admin: 07/08/21 05:58 Dose:  0.025 mg


   Documented by: 


Liothyronine Sodium (Liothyronine Sod 5 Mcg Tab)  5 mcg PO DAILYAC [facility]


   Last Admin: 07/08/21 05:58 Dose:  5 mcg


   Documented by: 


Melatonin (Melatonin 5 Mg Tablet)  5 mg PO BEDTIME PRN PRN


   PRN Reason: INSOMNIA


   Last Admin: 07/07/21 21:04 Dose:  5 mg


   Documented by: 


Methylprednisolone Sodium Succinate (Methylprednisolone 40 Mg Inj)  80 mg IV 

Q8HR [facility]


   Last Admin: 07/08/21 09:05 Dose:  80 mg


   Documented by: 


Metoprolol Tartrate (Metoprolol Tar 25 Mg Tab)  25 mg PO BID 6AM 6PM [facility]


   Last Admin: 07/08/21 05:58 Dose:  Not Given


   Documented by: 


Ondansetron HCl (Ondansetron 4 Mg/2 Ml Vial)  4 mg IV Q6HP PRN


   PRN Reason: NAUSEA / VOMITING


Sodium Chloride (Flush Normal Saline 10 Ml)  10 ml IV BID [facility]


   Last Admin: 07/08/21 09:00 Dose:  Not Given


   Documented by: 


Thiamine HCl (Thiamine Hcl 100 Mg Tablet)  200 mg PO DAILY [facility]


   Last Admin: 07/08/21 09:05 Dose:  200 mg


   Documented by: 


Zinc Sulfate (Zinc Sulfate 220 Mg Cap)  220 mg PO DAILY [facility]


   Last Admin: 07/08/21 09:04 Dose:  220 mg


   Documented by: 


Microbiology Results





07/06/21 16:15   Clean Catch Urine   [place] Count - Final


                            >100,000 CFU/ML.


07/06/21 16:15   Clean Catch Urine    - Preliminary


                            Klebsiella Pneumoniae


                            Proteus Mirabilis


07/06/21 15:55   Blood  - Blood   Aerobic Blood Culture - Preliminary


                            No growth in 24 hours.


07/06/21 15:55   Blood  - Blood   Anaerobic Blood Culture - Preliminary


                            No growth in 24 hours.


07/06/21 15:38   Blood  - Blood   Aerobic Blood Culture - Preliminary


                            No growth in 24 hours.


07/06/21 15:38   Blood  - Blood   Anaerobic Blood Culture - Preliminary


                            No growth in 24 hours.





Assessment/ Plan: 


Nephrology





Feeling better today.


Dyspnea improving with less Oxygen requirement.


No acute events overnight.





Vitals, medications, blood work and imaging reviewed in the chart.


General: Oriented x3, Cooperative


HEENT: Atraumatic


Neck: Supple


Respiratory: Diminished


Cardiovascular: No edema, Regular rate/rhythm


Gastrointestinal: Soft and benign, Non-distended


Musculoskeletal: No clubbing, No contractures


Integumentary: No rashes, No cyanosis


Neurological: Normal speech





Laboratory Data (last 24 hrs)


07/06/21 15:38: PT 12.1, INR 1.05, APTT 24.4


07/06/21 15:38: WBC 9.50  D, Hgb 11.5 L, Hct 35.1 L, Plt Count 248  D


07/06/21 15:38: Sodium 130 L, Potassium 4.8, BUN 56 H D, Creatinine 2.16 H, 

Glucose 357 H, Total Bilirubin 0.6, AST 42 H, ALT 17, Alkaline Phosphatase 124 

H, Lipase 11 L





Imagings Data: 


EXAM DESCRIPTION:  RAD - Chest Single View - 7/6/2021 3:20 pm


CLINICAL HISTORY:  Weakness, COVID positive


COMPARISON:  June 30


TECHNIQUE:  AP portable chest image was obtained 7/6/2021 3:20 pm .


FINDINGS:  Lung volumes are low. Interstitial opacification is evident similar 

to comparison. No dense consolidation. There are some scattered alveolar 

opacities. Pattern is not specific but can be seen with a mild COVID-19 p

neumonia.


Sternotomy wires are in place. Trachea is midline. Heart and vasculature are 

normal. No measurable pleural effusion and no pneumothorax. No acute bony 

abnormality seen. No acute aortic findings suspected.


IMPRESSION:  Interstitial and patchy alveolar opacities are present.


Pattern is nonspecific but could indicate a mild COVID-19 pneumonia.





Conclusions/Impression: 


FERMIN likely due to hypovolemia


CKD III with proteinuria


-No NSAIDs


-Change IVF 1/2NS





Hyponatremia


-Encourage nutrition


-Hold HCTZ





Hypocalcemia


-Continue Vitamin D3





HTN with CKD


-Continue Metoprolol





DM II with CKD


-RISS


-Hold Metformin





Moderate malnutrition


-Encourage nutrition





Anemia in chronic illness


-Monitor H&H





Acute GNR Cystitis


-Follow up culture


-Continue Rocephin





COVID-19 PNA


-Continue steroids


-Agree with anticoagulation


-Continue Oxygen supplementation

## 2021-07-09 LAB
ALBUMIN SERPL BCP-MCNC: 1.9 G/DL (ref 3.4–5)
ALP SERPL-CCNC: 106 U/L (ref 45–117)
ALT SERPL W P-5'-P-CCNC: 17 U/L (ref 12–78)
AST SERPL W P-5'-P-CCNC: 52 U/L (ref 15–37)
BUN BLD-MCNC: 41 MG/DL (ref 7–18)
CRP SERPL-MCNC: 36 MG/L (ref ?–3)
FERRITIN SERPL-MCNC: 823.9 NG/ML (ref 8–388)
GLUCOSE SERPLBLD-MCNC: 186 MG/DL (ref 74–106)
HCT VFR BLD CALC: 30.8 % (ref 36–45)
LYMPHOCYTES # SPEC AUTO: 0.8 K/UL (ref 0.7–4.9)
PMV BLD: 8.5 FL (ref 7.6–11.3)
POTASSIUM SERPL-SCNC: 3.5 MMOL/L (ref 3.5–5.1)
RBC # BLD: 3.32 M/UL (ref 3.86–4.86)

## 2021-07-09 RX ADMIN — APIXABAN SCH MG: 5 TABLET, FILM COATED ORAL at 21:05

## 2021-07-09 RX ADMIN — METOPROLOL TARTRATE SCH MG: 25 TABLET ORAL at 17:02

## 2021-07-09 RX ADMIN — BARICITINIB SCH MG: 2 TABLET, FILM COATED ORAL at 08:31

## 2021-07-09 RX ADMIN — Medication SCH ML: at 08:32

## 2021-07-09 RX ADMIN — Medication SCH MG: at 12:02

## 2021-07-09 RX ADMIN — DOXAZOSIN SCH MG: 2 TABLET ORAL at 21:05

## 2021-07-09 RX ADMIN — IVERMECTIN SCH MG: 3 TABLET ORAL at 12:10

## 2021-07-09 RX ADMIN — SODIUM CHLORIDE SCH MLS: 9 INJECTION, SOLUTION INTRAVENOUS at 21:07

## 2021-07-09 RX ADMIN — Medication SCH ML: at 21:00

## 2021-07-09 RX ADMIN — SODIUM CHLORIDE SCH MLS: 9 INJECTION, SOLUTION INTRAVENOUS at 10:15

## 2021-07-09 RX ADMIN — METHYLPREDNISOLONE SODIUM SUCCINATE SCH MG: 40 INJECTION, POWDER, FOR SOLUTION INTRAMUSCULAR; INTRAVENOUS at 23:55

## 2021-07-09 RX ADMIN — AMLODIPINE BESYLATE SCH MG: 5 TABLET ORAL at 08:38

## 2021-07-09 RX ADMIN — Medication SCH MG: at 08:25

## 2021-07-09 RX ADMIN — DOXAZOSIN SCH MG: 2 TABLET ORAL at 08:25

## 2021-07-09 RX ADMIN — HUMAN INSULIN SCH UNIT: 100 INJECTION, SOLUTION SUBCUTANEOUS at 17:02

## 2021-07-09 RX ADMIN — CLOPIDOGREL BISULFATE SCH MG: 75 TABLET, FILM COATED ORAL at 08:25

## 2021-07-09 RX ADMIN — LEVOTHYROXINE SODIUM SCH MG: 112 TABLET ORAL at 06:36

## 2021-07-09 RX ADMIN — ISOSORBIDE MONONITRATE SCH MG: 30 TABLET, EXTENDED RELEASE ORAL at 17:03

## 2021-07-09 RX ADMIN — Medication SCH MG: at 21:05

## 2021-07-09 RX ADMIN — Medication SCH MG: at 08:24

## 2021-07-09 RX ADMIN — HUMAN INSULIN SCH UNIT: 100 INJECTION, SOLUTION SUBCUTANEOUS at 21:03

## 2021-07-09 RX ADMIN — ZINC SULFATE CAP 220 MG (50 MG ELEMENTAL ZN) SCH MG: 220 (50 ZN) CAP at 08:25

## 2021-07-09 RX ADMIN — APIXABAN SCH MG: 5 TABLET, FILM COATED ORAL at 08:25

## 2021-07-09 RX ADMIN — SODIUM CHLORIDE SCH: 0.45 INJECTION, SOLUTION INTRAVENOUS at 02:00

## 2021-07-09 RX ADMIN — INSULIN GLARGINE SCH UNITS: 100 INJECTION, SOLUTION SUBCUTANEOUS at 21:05

## 2021-07-09 RX ADMIN — CHOLECALCIFEROL TAB 25 MCG (1000 UNIT) SCH UNIT: 25 TAB at 08:24

## 2021-07-09 RX ADMIN — METOPROLOL TARTRATE SCH: 25 TABLET ORAL at 05:50

## 2021-07-09 RX ADMIN — METHYLPREDNISOLONE SODIUM SUCCINATE SCH MG: 40 INJECTION, POWDER, FOR SOLUTION INTRAMUSCULAR; INTRAVENOUS at 17:02

## 2021-07-09 RX ADMIN — LIOTHYRONINE SODIUM SCH MCG: 5 TABLET ORAL at 06:36

## 2021-07-09 RX ADMIN — ATORVASTATIN CALCIUM SCH MG: 20 TABLET, FILM COATED ORAL at 21:05

## 2021-07-09 RX ADMIN — HUMAN INSULIN SCH: 100 INJECTION, SOLUTION SUBCUTANEOUS at 07:30

## 2021-07-09 RX ADMIN — ASPIRIN SCH MG: 81 TABLET, COATED ORAL at 08:24

## 2021-07-09 RX ADMIN — HUMAN INSULIN SCH UNIT: 100 INJECTION, SOLUTION SUBCUTANEOUS at 12:02

## 2021-07-09 RX ADMIN — Medication SCH MG: at 17:02

## 2021-07-09 RX ADMIN — METHYLPREDNISOLONE SODIUM SUCCINATE SCH MG: 40 INJECTION, POWDER, FOR SOLUTION INTRAMUSCULAR; INTRAVENOUS at 00:23

## 2021-07-09 RX ADMIN — METHYLPREDNISOLONE SODIUM SUCCINATE SCH MG: 40 INJECTION, POWDER, FOR SOLUTION INTRAMUSCULAR; INTRAVENOUS at 08:35

## 2021-07-09 RX ADMIN — LEVOTHYROXINE SODIUM SCH MG: 25 TABLET ORAL at 06:36

## 2021-07-09 NOTE — RAD REPORT
EXAM DESCRIPTION:  RAD - Chest Single View - 7/9/2021 6:45 am

 

CLINICAL HISTORY:  follow up COVID

 

COMPARISON:  Chest Single View dated 7/6/2021; Chest Single View dated 6/30/2021; Chest Pa And Lat (2
 Views) dated 6/17/2021; Chest Pa And Lat (2 Views) dated 12/4/2017

 

FINDINGS:  Increasing prominence of the pulmonary interstitium, particularly along the left mid lung 
with there are developing airspace opacities similar cardiomegaly.Sternotomy.. Difficult to exclude a
 small left effusion.

 

IMPRESSION:  Question increasing airspace disease in the left mid lung which could reflect pneumonia 
superimposed upon a background of interstitial edema.

## 2021-07-09 NOTE — P.PN
Subjective


Date of Service: 07/09/21


Primary Care Provider: none- previously Dr. montiel, nephrology Dr. Mckeon


Chief Complaint: NSTEMI, hypoxemia, COVID pneumonia


Subjective: Improving, Doing well (She is feeling better. She was able to get 

out of bed to the bathroom. HF decreased to 75%)





Physical Examination





- Vital Signs


Temperature: 96.5 F


Blood Pressure: 167/73


Pulse: 57


Respirations: 18


Pulse Ox (%): 93





- Studies


Microbiology Data (last 24 hrs): 








07/06/21 16:15   Clean Catch Urine   [place] Count - Final


                            >100,000 CFU/ML.








Assessment & Plan


Discharge Plan: Home


Plan to discharge in: Greater than 2 days


Physician Review Additional Text: 





Physical Exam:


General: Patient alert, cooperative. Currently on HF at 75%


HEENT: Atraumatic, PERRLA, Mucous membr. moist/pink


Neck: Supple, 2+ carotid pulse no bruit, No LAD


Respiratory: Patient currently on 75% high flow, Better air movement.


Cardiovascular: Regular rate/rhythm, Normal S1 S2


Gastrointestinal: Normal bowel sounds, No tenderness


Musculoskeletal: No tenderness


Integumentary: No rashes


Neurological: Normal speech, Normal strength at 5/5 x4 extr, Normal tone, Normal

affect





Impression:


NSTEMI with history of CAD status post CABG 1996


Acute hypoxic respiratory failure secondary to COVID pneumonia


FERMIN superimposed on CKD 3


Diabetes mellitus type 2 with hyperglycemia


UTI/fungal with urine culture positive for Klebsiella and Proteus, Multidrug 

resistant


Hypertension


Hyperlipidemia








Plan


NSTEMI with history of CAD status post CABG 1996: Continue to trend troponin.  

Case discussed with cardiology.  Cardiology plans for no cardiac intervention 

due to her Covid infection.  Continue Plavix, Imdur, metoprolol, Lipitor.  Will 

adjust BP for better control. Will add Norvasc.  PICC line ordered due to 

multidrug resistant UTI. On Merrem. Wean off HF. Continue to treat Covid 

infection at this time. 


Acute hypoxic respiratory failure secondary to COVID pneumonia: Continue IV 

Solu-Medrol and vitamin supplementation.  Patient also receiving Baricitinib.  

Continue to monitor liver function.  Continue to wean off high flow.  Encourage 

proning.  Encourage incentive spirometer.  Pulmonology has adjusted DVT 

prophylaxis to Eliquis.   Recheck chest x-ray. Overall improved. 


FERMIN superimposed on CKD 3: Continue to do well. Renal function back to baseline.

DC IV fluids. Encourage oral intake. Renal ultrasound to be obtained.  Continue 

with nephrology recommendation.


Diabetes mellitus type 2 with hyperglycemia: Continue Accu-Cheks.  Will monitor 

closely.  Continue Lantus.  Will make adjustments appropriately.


UTI/fungal with urine culture positive for Klebsiella and Proteus, Multidrug 

resistant: Cultures obtained.  Continue Merrem. Will need treatment of IV 

medication for one week. Day 2 today. Will order PICC line in preparation for 

this.


Hypertension: Continue medicationCardura and metoprolol. Add Norvasc for better

control.


Hyperlipidemia:  Continue home medicationLipitor.





Code status


DNR





DVT prophylaxis:


Eliquis





Advanced Care planning-30 min:


Home at DC





Time Spent Managing Pts Care (In Minutes): 55

## 2021-07-09 NOTE — P.PN
Date of Service: 07/09/21


Vital Signs











Temp Pulse Resp BP Pulse Ox


 


 97.1 F   71   16   143/65 H  96 


 


 07/09/21 15:51  07/09/21 17:02  07/09/21 15:51  07/09/21 17:02  07/09/21 15:51





Medications





Acetaminophen (Acetaminophen 500 Mg Tab)  500 mg PO Q4HP PRN


   PRN Reason: Pain scale 2-4 (Mild)


Allopurinol (Allopurinol 300 Mg Tab)  300 mg PO DAILY [facility]


   Last Admin: 07/09/21 08:26 Dose:  300 mg


   Documented by: 


Amlodipine Besylate (Amlodipine 5 Mg Tab)  5 mg PO DAILY [facility]


   Last Admin: 07/09/21 08:38 Dose:  5 mg


   Documented by: 


Apixaban (Apixaban 5 Mg Tablet)  5 mg PO BID [facility]


   Last Admin: 07/09/21 08:25 Dose:  5 mg


   Documented by: 


Ascorbic Acid (Ascorbic Acid 500 Mg Tablet)  500 mg PO QID [facility]


   Last Admin: 07/09/21 17:02 Dose:  500 mg


   Documented by: 


Aspirin (Aspirin Ec 81 Mg Tab)  81 mg PO DAILY [facility]


   Last Admin: 07/09/21 08:24 Dose:  81 mg


   Documented by: 


Atorvastatin Calcium (Atorvastatin 20 Mg Tab)  20 mg PO BEDTIME [facility]


   Last Admin: 07/08/21 22:35 Dose:  20 mg


   Documented by: 


Benzonatate (Benzonatate 100 Mg Cap)  100 mg PO TID PRN


   PRN Reason: COUGH


   Last Admin: 07/08/21 06:03 Dose:  100 mg


   Documented by: 


Cholecalciferol (Vitamin D 1000 Unit Tab)  2,000 unit PO DAILY [facility]


   Last Admin: 07/09/21 08:24 Dose:  2,000 unit


   Documented by: 


Clopidogrel Bisulfate (Clopidogrel 75 Mg Tablet)  75 mg PO DAILY [facility]


   Last Admin: 07/09/21 08:25 Dose:  75 mg


   Documented by: 


Dextrose (D50w 25 Gm/50 Ml Vial)  12.5 gm IV PRN PRN; Protocol


   PRN Reason: HYPOGLYCEMIA


Doxazosin Mesylate (Doxazosin 2 Mg Tab)  2 mg PO BID [facility]


   Last Admin: 07/09/21 08:25 Dose:  2 mg


   Documented by: 


Glucagon (Glucagon 1 Mg/Vial)  1 mg IM 1X PRN; Protocol


   PRN Reason: HYPOGLYCEMIA


Meropenem 1,000 mg/ Sodium (Chloride)  100 mls @ 200 mls/hr IV Q12HR [facility]


   Last Admin: 07/09/21 10:15 Dose:  100 mls


   Documented by: 


Insulin Glargine (Insulin Glargine 100 Units/Ml)  20 units SQ BEDTIME [facility]


   Last Admin: 07/08/21 22:36 Dose:  20 units


   Documented by: 


Insulin Human Regular (Insulin -Regular Human 50 Unit/0.5 Ml Ml)  0 unit SQ ACHS

[facility]; Protocol


   Last Admin: 07/09/21 17:02 Dose:  9 unit


   Documented by: 


Isosorbide Mononitrate (Isosorbide Mono Sr 30 Mg Tab)  30 mg PO DAILY AFTER 

SUPPER [facility]


   Last Admin: 07/09/21 17:03 Dose:  30 mg


   Documented by: 


Levothyroxine Sodium (Levothyroxine Sod 0.112 Mg Tab)  0.112 mg PO DAILYAC [facility]


   Last Admin: 07/09/21 06:36 Dose:  0.112 mg


   Documented by: 


Levothyroxine Sodium (Levothyroxine Sod 0.025 Mg Tab)  0.025 mg PO DAILYAC [facility]


   Last Admin: 07/09/21 06:36 Dose:  0.025 mg


   Documented by: 


Liothyronine Sodium (Liothyronine Sod 5 Mcg Tab)  5 mcg PO DAILYAC [facility]


   Last Admin: 07/09/21 06:36 Dose:  5 mcg


   Documented by: 


Melatonin (Melatonin 5 Mg Tablet)  5 mg PO BEDTIME PRN PRN


   PRN Reason: INSOMNIA


   Last Admin: 07/08/21 22:35 Dose:  5 mg


   Documented by: 


Methylprednisolone Sodium Succinate (Methylprednisolone 40 Mg Inj)  80 mg IV 

Q8HR [facility]


   Last Admin: 07/09/21 17:02 Dose:  80 mg


   Documented by: 


Metoprolol Tartrate (Metoprolol Tar 25 Mg Tab)  25 mg PO BID 6AM 6PM [facility]


   Last Admin: 07/09/21 17:02 Dose:  25 mg


   Documented by: 


Ondansetron HCl (Ondansetron 4 Mg/2 Ml Vial)  4 mg IV Q6HP PRN


   PRN Reason: NAUSEA / VOMITING


Sodium Chloride (Flush Normal Saline 10 Ml)  10 ml IV BID [facility]


   Last Admin: 07/09/21 08:32 Dose:  10 ml


   Documented by: 


Thiamine HCl (Thiamine Hcl 100 Mg Tablet)  200 mg PO DAILY [facility]


   Last Admin: 07/09/21 08:24 Dose:  200 mg


   Documented by: 


Zinc Sulfate (Zinc Sulfate 220 Mg Cap)  220 mg PO DAILY [facility]


   Last Admin: 07/09/21 08:25 Dose:  220 mg


   Documented by: 


Microbiology Results





07/06/21 16:15   Clean Catch Urine   [place] Count - Final


                            >100,000 CFU/ML.


07/06/21 16:15   Clean Catch Urine    - Final


                            Klebsiella Pneumoniae


                            Proteus Mirabilis


07/06/21 15:55   Blood  - Blood   Aerobic Blood Culture - Preliminary


                            No growth in 24 hours.


07/06/21 15:55   Blood  - Blood   Anaerobic Blood Culture - Preliminary


                            No growth in 24 hours.


07/06/21 15:38   Blood  - Blood   Aerobic Blood Culture - Preliminary


                            No growth in 24 hours.


07/06/21 15:38   Blood  - Blood   Anaerobic Blood Culture - Preliminary


                            No growth in 24 hours.





Assessment/ Plan: 


Nephrology





Feeling better today.


Dyspnea with exertion.  Persistent hypoxia.


No acute events overnight.





Vitals, medications, blood work and imaging reviewed in the chart.


General: Oriented x3, Cooperative


HEENT: Atraumatic


Neck: Supple


Respiratory: CTA


Cardiovascular: No edema, Regular rate/rhythm


Gastrointestinal: Soft and benign, Non-distended


Musculoskeletal: No clubbing, No contractures


Integumentary: No rashes, No cyanosis


Neurological: Normal speech





Laboratory Data (last 24 hrs)


07/06/21 15:38: PT 12.1, INR 1.05, APTT 24.4


07/06/21 15:38: WBC 9.50  D, Hgb 11.5 L, Hct 35.1 L, Plt Count 248  D


07/06/21 15:38: Sodium 130 L, Potassium 4.8, BUN 56 H D, Creatinine 2.16 H, 

Glucose 357 H, Total Bilirubin 0.6, AST 42 H, ALT 17, Alkaline Phosphatase 124 

H, Lipase 11 L





Imagings Data: 


EXAM DESCRIPTION:  RAD - Chest Single View - 7/6/2021 3:20 pm


CLINICAL HISTORY:  Weakness, COVID positive


COMPARISON:  June 30


TECHNIQUE:  AP portable chest image was obtained 7/6/2021 3:20 pm .


FINDINGS:  Lung volumes are low. Interstitial opacification is evident similar 

to comparison. No dense consolidation. There are some scattered alveolar 

opacities. Pattern is not specific but can be seen with a mild COVID-19 

pneumonia.


Sternotomy wires are in place. Trachea is midline. Heart and vasculature are 

normal. No measurable pleural effusion and no pneumothorax. No acute bony 

abnormality seen. No acute aortic findings suspected.


IMPRESSION:  Interstitial and patchy alveolar opacities are present.


Pattern is nonspecific but could indicate a mild COVID-19 pneumonia.





Conclusions/Impression: 


FERMIN likely due to hypovolemia


CKD III with proteinuria


-No NSAIDs


-Discontinue IVF 1/2NS





Hyponatremia


-Encourage nutrition


-Hold HCTZ





Hypokalemia


-Replete potassium





Hypocalcemia


-Continue Vitamin D3





HTN with CKD


-Continue Metoprolol





DM II with CKD


-RISS


-Hold Metformin





Moderate malnutrition


-Encourage nutrition





Anemia in chronic illness


-Monitor H&H





Acute GNR Cystitis


-Abx changed to Meropenem





COVID-19 PNA


-Continue steroids


-Agree with anticoagulation


-Continue Oxygen supplementation





Case reviewed with Dr. Sams

## 2021-07-10 LAB
ALBUMIN SERPL BCP-MCNC: 2 G/DL (ref 3.4–5)
ALP SERPL-CCNC: 104 U/L (ref 45–117)
ALT SERPL W P-5'-P-CCNC: 19 U/L (ref 12–78)
AST SERPL W P-5'-P-CCNC: 50 U/L (ref 15–37)
BUN BLD-MCNC: 39 MG/DL (ref 7–18)
CRP SERPL-MCNC: 20 MG/L (ref ?–3)
FERRITIN SERPL-MCNC: 625.9 NG/ML (ref 8–388)
GLUCOSE SERPLBLD-MCNC: 137 MG/DL (ref 74–106)
HCT VFR BLD CALC: 30.4 % (ref 36–45)
LYMPHOCYTES # SPEC AUTO: 0.6 K/UL (ref 0.7–4.9)
MAGNESIUM SERPL-MCNC: 1.9 MG/DL (ref 1.8–2.4)
PMV BLD: 8.7 FL (ref 7.6–11.3)
POTASSIUM SERPL-SCNC: 3.7 MMOL/L (ref 3.5–5.1)
RBC # BLD: 3.32 M/UL (ref 3.86–4.86)

## 2021-07-10 PROCEDURE — 02HV33Z INSERTION OF INFUSION DEVICE INTO SUPERIOR VENA CAVA, PERCUTANEOUS APPROACH: ICD-10-PCS

## 2021-07-10 RX ADMIN — HUMAN INSULIN SCH UNIT: 100 INJECTION, SOLUTION SUBCUTANEOUS at 11:14

## 2021-07-10 RX ADMIN — ZINC SULFATE CAP 220 MG (50 MG ELEMENTAL ZN) SCH MG: 220 (50 ZN) CAP at 07:49

## 2021-07-10 RX ADMIN — INSULIN GLARGINE SCH UNITS: 100 INJECTION, SOLUTION SUBCUTANEOUS at 20:27

## 2021-07-10 RX ADMIN — LEVOTHYROXINE SODIUM SCH MG: 25 TABLET ORAL at 06:12

## 2021-07-10 RX ADMIN — SODIUM CHLORIDE SCH MLS: 9 INJECTION, SOLUTION INTRAVENOUS at 07:50

## 2021-07-10 RX ADMIN — METHYLPREDNISOLONE SODIUM SUCCINATE SCH MG: 40 INJECTION, POWDER, FOR SOLUTION INTRAMUSCULAR; INTRAVENOUS at 07:48

## 2021-07-10 RX ADMIN — CHOLECALCIFEROL TAB 25 MCG (1000 UNIT) SCH UNIT: 25 TAB at 07:49

## 2021-07-10 RX ADMIN — SODIUM CHLORIDE SCH MLS: 9 INJECTION, SOLUTION INTRAVENOUS at 20:28

## 2021-07-10 RX ADMIN — Medication SCH MG: at 16:22

## 2021-07-10 RX ADMIN — Medication SCH ML: at 20:26

## 2021-07-10 RX ADMIN — Medication SCH MG: at 11:15

## 2021-07-10 RX ADMIN — CLOPIDOGREL BISULFATE SCH MG: 75 TABLET, FILM COATED ORAL at 07:50

## 2021-07-10 RX ADMIN — ISOSORBIDE MONONITRATE SCH MG: 30 TABLET, EXTENDED RELEASE ORAL at 16:23

## 2021-07-10 RX ADMIN — Medication PRN MG: at 20:25

## 2021-07-10 RX ADMIN — HUMAN INSULIN SCH UNIT: 100 INJECTION, SOLUTION SUBCUTANEOUS at 16:22

## 2021-07-10 RX ADMIN — Medication SCH MG: at 07:49

## 2021-07-10 RX ADMIN — METOPROLOL TARTRATE SCH MG: 25 TABLET ORAL at 17:06

## 2021-07-10 RX ADMIN — LIOTHYRONINE SODIUM SCH MCG: 5 TABLET ORAL at 06:12

## 2021-07-10 RX ADMIN — HUMAN INSULIN SCH: 100 INJECTION, SOLUTION SUBCUTANEOUS at 07:30

## 2021-07-10 RX ADMIN — HUMAN INSULIN SCH UNIT: 100 INJECTION, SOLUTION SUBCUTANEOUS at 20:27

## 2021-07-10 RX ADMIN — ATORVASTATIN CALCIUM SCH MG: 20 TABLET, FILM COATED ORAL at 20:26

## 2021-07-10 RX ADMIN — DOXAZOSIN SCH MG: 2 TABLET ORAL at 07:50

## 2021-07-10 RX ADMIN — BARICITINIB SCH MG: 2 TABLET, FILM COATED ORAL at 07:53

## 2021-07-10 RX ADMIN — Medication SCH ML: at 07:51

## 2021-07-10 RX ADMIN — APIXABAN SCH MG: 5 TABLET, FILM COATED ORAL at 07:49

## 2021-07-10 RX ADMIN — ASPIRIN SCH MG: 81 TABLET, COATED ORAL at 07:48

## 2021-07-10 RX ADMIN — AMLODIPINE BESYLATE SCH MG: 5 TABLET ORAL at 07:49

## 2021-07-10 RX ADMIN — APIXABAN SCH MG: 5 TABLET, FILM COATED ORAL at 20:25

## 2021-07-10 RX ADMIN — METOPROLOL TARTRATE SCH MG: 25 TABLET ORAL at 06:12

## 2021-07-10 RX ADMIN — Medication SCH MG: at 20:26

## 2021-07-10 RX ADMIN — METHYLPREDNISOLONE SODIUM SUCCINATE SCH MG: 40 INJECTION, POWDER, FOR SOLUTION INTRAMUSCULAR; INTRAVENOUS at 16:22

## 2021-07-10 RX ADMIN — LEVOTHYROXINE SODIUM SCH MG: 112 TABLET ORAL at 06:12

## 2021-07-10 RX ADMIN — DOXAZOSIN SCH MG: 2 TABLET ORAL at 20:25

## 2021-07-10 NOTE — P.PN
Subjective


Date of Service: 07/10/21


Primary Care Provider: none- previously Dr. montiel, nephrology Dr. Mckeon


Chief Complaint: NSTEMI, hypoxemia, COVID pneumonia


Subjective: Improving





Physical Examination





- Vital Signs


Temperature: 97.3 F


Blood Pressure: 155/68


Pulse: 57


Respirations: 18


Pulse Ox (%): 93





- Studies


Microbiology Data (last 24 hrs): 








07/06/21 16:15   Clean Catch Urine   [place] Count - Final


                            >100,000 CFU/ML.


07/06/21 16:15   Clean Catch Urine    - Final


                            Klebsiella Pneumoniae


                            Proteus Mirabilis








Assessment & Plan


Discharge Plan: Home


Plan to discharge in: Greater than 2 days


Physician Review Additional Text: 





Follow up CXR: 


COMPARISON:  July 9, July 6 


TECHNIQUE:  AP portable chest image was obtained 7/10/2021 8:22 am . 


FINDINGS:  Lung volumes are low. Bilateral airspace opacification is present 

worse in the lower left lung field. Pattern is not substantially different from 

comparison. Heart size remains prominent. Vasculature is prominent. All chest 

findings are exaggerated by the low lung volumes. No measurable pleural effusion

and no pneumothorax. No acute bony abnormality seen. No acute aortic findings 

suspected. 


IMPRESSION:  Bilateral COVID-19 pneumonia pattern mild to moderate, worse in the

lower left lung field. 


No significant change from comparison.





Physical Exam:


General: Patient alert, cooperative. Currently on HF at 70%


HEENT: Atraumatic, PERRLA, Mucous membr. moist/pink


Neck: Supple, 2+ carotid pulse no bruit, No LAD


Respiratory: Patient currently on 70 % high flow, Better air movement.


Cardiovascular: Regular rate/rhythm, Normal S1 S2


Gastrointestinal: Normal bowel sounds, No tenderness


Musculoskeletal: No tenderness


Integumentary: No rashes


Neurological: Normal speech, Normal strength at 5/5 x4 extr, Normal tone, Normal

affect





Impression:


NSTEMI with history of CAD status post CABG 1996


Acute hypoxic respiratory failure secondary to COVID pneumonia


FERMIN superimposed on CKD 3


Diabetes mellitus type 2 with hyperglycemia


UTI/fungal with urine culture positive for Klebsiella and Proteus, Multidrug 

resistant


Hypertension


Hyperlipidemia








Plan


NSTEMI with history of CAD status post CABG 1996: Overall stable.  Case 

discussed with cardiology.  Cardiology plans for no cardiac intervention due to 

her Covid infection.  Continue Plavix, Imdur, metoprolol, Lipitor.  Will 

continue to adjust BP for better control.  Norvasc increased to 10 mg daily.  

PICC line ordered due to multidrug resistant UTIKlebsiella/Proteus.  Now on 

Merrem. Wean off HF. Continue to treat Covid infection at this time.  Physical 

therapy ordered.


Acute hypoxic respiratory failure secondary to COVID pneumonia: Continue IV 

Solu-Medrol and vitamin supplementation.  Patient to complete baricitinib.  

Continue to monitor liver function.  Continue to wean off high flow.  Encourage 

proning.  Encourage incentive spirometer.  Pulmonology has adjusted DVT 

prophylaxis to Eliquis.  Patient now on 70% high flow.  Continue to monitor 

closely.


FERMIN superimposed on CKD 3: Continue to do well. Renal function back to baseline.

Encourage oral intake. Renal ultrasound to be obtained.  Continue with nep

hrology recommendation.


Diabetes mellitus type 2 with hyperglycemia: Continue Accu-Cheks.  Will monitor 

closely.  Continue Lantus.  Will make adjustments appropriately.


UTI/fungal with urine culture positive for Klebsiella and Proteus, Multidrug 

resistant: Cultures obtained.  Continue Merrem. Will need treatment of IV 

medication for one week. Day 3 today. Will order PICC line in preparation for 

this.


Hypertension: Continue medicationCardura and metoprolol.  Increase Norvasc for 

better control.


Hyperlipidemia:  Continue home medicationLipitor.





Code status


DNR





DVT prophylaxis:


Eliquis





Advanced Care planning-30 min:


Home at [place]





Time Spent Managing Pts Care (In Minutes): 55

## 2021-07-10 NOTE — RAD REPORT
EXAM DESCRIPTION:  RAD - Chest Single View - 7/10/2021 8:22 am

 

CLINICAL HISTORY:  COVID pneumonia

 

COMPARISON:  July 9, July 6

 

TECHNIQUE:  AP portable chest image was obtained 7/10/2021 8:22 am .

 

FINDINGS:  Lung volumes are low. Bilateral airspace opacification is present worse in the lower left 
lung field. Pattern is not substantially different from comparison. Heart size remains prominent. Vas
culature is prominent. All chest findings are exaggerated by the low lung volumes. No measurable pleu
ral effusion and no pneumothorax. No acute bony abnormality seen. No acute aortic findings suspected.


 

IMPRESSION:  Bilateral COVID-19 pneumonia pattern mild to moderate, worse in the lower left lung fiel
d.

 

No significant change from comparison.

## 2021-07-11 LAB
ALBUMIN SERPL BCP-MCNC: 2.1 G/DL (ref 3.4–5)
ALP SERPL-CCNC: 109 U/L (ref 45–117)
ALT SERPL W P-5'-P-CCNC: 21 U/L (ref 12–78)
AST SERPL W P-5'-P-CCNC: 55 U/L (ref 15–37)
BUN BLD-MCNC: 42 MG/DL (ref 7–18)
CRP SERPL-MCNC: 14.6 MG/L (ref ?–3)
FERRITIN SERPL-MCNC: 602.4 NG/ML (ref 8–388)
GLUCOSE SERPLBLD-MCNC: 237 MG/DL (ref 74–106)
HCT VFR BLD CALC: 32.6 % (ref 36–45)
LYMPHOCYTES # SPEC AUTO: 0.6 K/UL (ref 0.7–4.9)
MAGNESIUM SERPL-MCNC: 2 MG/DL (ref 1.8–2.4)
MORPHOLOGY BLD-IMP: (no result)
PMV BLD: 8.9 FL (ref 7.6–11.3)
POTASSIUM SERPL-SCNC: 3.8 MMOL/L (ref 3.5–5.1)
RBC # BLD: 3.49 M/UL (ref 3.86–4.86)

## 2021-07-11 RX ADMIN — Medication SCH MG: at 20:22

## 2021-07-11 RX ADMIN — SODIUM CHLORIDE SCH MLS: 9 INJECTION, SOLUTION INTRAVENOUS at 21:00

## 2021-07-11 RX ADMIN — DOXAZOSIN SCH MG: 2 TABLET ORAL at 20:22

## 2021-07-11 RX ADMIN — Medication SCH MG: at 16:40

## 2021-07-11 RX ADMIN — Medication SCH MG: at 07:51

## 2021-07-11 RX ADMIN — Medication SCH ML: at 07:54

## 2021-07-11 RX ADMIN — SODIUM CHLORIDE SCH MLS: 9 INJECTION, SOLUTION INTRAVENOUS at 07:53

## 2021-07-11 RX ADMIN — INSULIN GLARGINE SCH UNITS: 100 INJECTION, SOLUTION SUBCUTANEOUS at 20:22

## 2021-07-11 RX ADMIN — CHOLECALCIFEROL TAB 25 MCG (1000 UNIT) SCH UNIT: 25 TAB at 07:51

## 2021-07-11 RX ADMIN — ASPIRIN SCH MG: 81 TABLET, COATED ORAL at 11:47

## 2021-07-11 RX ADMIN — ACETAMINOPHEN PRN MG: 500 TABLET, FILM COATED ORAL at 00:38

## 2021-07-11 RX ADMIN — METHYLPREDNISOLONE SODIUM SUCCINATE SCH MG: 40 INJECTION, POWDER, FOR SOLUTION INTRAMUSCULAR; INTRAVENOUS at 00:18

## 2021-07-11 RX ADMIN — LIOTHYRONINE SODIUM SCH MCG: 5 TABLET ORAL at 06:27

## 2021-07-11 RX ADMIN — BARICITINIB SCH MG: 2 TABLET, FILM COATED ORAL at 07:55

## 2021-07-11 RX ADMIN — HUMAN INSULIN SCH UNIT: 100 INJECTION, SOLUTION SUBCUTANEOUS at 11:47

## 2021-07-11 RX ADMIN — ATORVASTATIN CALCIUM SCH MG: 20 TABLET, FILM COATED ORAL at 20:22

## 2021-07-11 RX ADMIN — METOPROLOL TARTRATE SCH MG: 25 TABLET ORAL at 16:40

## 2021-07-11 RX ADMIN — ASPIRIN SCH MG: 81 TABLET, COATED ORAL at 07:51

## 2021-07-11 RX ADMIN — APIXABAN SCH MG: 5 TABLET, FILM COATED ORAL at 07:52

## 2021-07-11 RX ADMIN — ISOSORBIDE MONONITRATE SCH MG: 30 TABLET, EXTENDED RELEASE ORAL at 16:40

## 2021-07-11 RX ADMIN — METHYLPREDNISOLONE SODIUM SUCCINATE SCH MG: 40 INJECTION, POWDER, FOR SOLUTION INTRAMUSCULAR; INTRAVENOUS at 16:41

## 2021-07-11 RX ADMIN — HUMAN INSULIN SCH UNIT: 100 INJECTION, SOLUTION SUBCUTANEOUS at 16:33

## 2021-07-11 RX ADMIN — Medication SCH ML: at 20:23

## 2021-07-11 RX ADMIN — LEVOTHYROXINE SODIUM SCH MG: 112 TABLET ORAL at 06:27

## 2021-07-11 RX ADMIN — Medication SCH MG: at 11:47

## 2021-07-11 RX ADMIN — Medication PRN MG: at 20:22

## 2021-07-11 RX ADMIN — AMLODIPINE BESYLATE SCH MG: 10 TABLET ORAL at 07:51

## 2021-07-11 RX ADMIN — METOPROLOL TARTRATE SCH MG: 25 TABLET ORAL at 06:27

## 2021-07-11 RX ADMIN — HUMAN INSULIN SCH UNIT: 100 INJECTION, SOLUTION SUBCUTANEOUS at 20:23

## 2021-07-11 RX ADMIN — Medication SCH MG: at 07:52

## 2021-07-11 RX ADMIN — ZINC SULFATE CAP 220 MG (50 MG ELEMENTAL ZN) SCH MG: 220 (50 ZN) CAP at 07:50

## 2021-07-11 RX ADMIN — DOXAZOSIN SCH MG: 2 TABLET ORAL at 07:51

## 2021-07-11 RX ADMIN — METHYLPREDNISOLONE SODIUM SUCCINATE SCH MG: 40 INJECTION, POWDER, FOR SOLUTION INTRAMUSCULAR; INTRAVENOUS at 07:56

## 2021-07-11 RX ADMIN — CLOPIDOGREL BISULFATE SCH MG: 75 TABLET, FILM COATED ORAL at 07:51

## 2021-07-11 RX ADMIN — LEVOTHYROXINE SODIUM SCH MG: 25 TABLET ORAL at 06:27

## 2021-07-11 RX ADMIN — HUMAN INSULIN SCH UNIT: 100 INJECTION, SOLUTION SUBCUTANEOUS at 07:52

## 2021-07-11 NOTE — P.PN
Subjective


Date of Service: 07/11/21


Primary Care Provider: none- previously Dr. montiel, nephrology Dr. Mckeon


Chief Complaint: NSTEMI, hypoxemia, COVID pneumonia


Subjective: Improving (Patient now down to 55% high flow)





Physical Examination





- Vital Signs


Temperature: 97.3 F


Blood Pressure: 134/52


Pulse: 61


Respirations: 18


Pulse Ox (%): 90





Assessment & Plan


Discharge Plan: Home


Plan to discharge in: Greater than 2 days


Physician Review Additional Text: 





Follow up CXR: 


COMPARISON:  July 9, July 6 


TECHNIQUE:  AP portable chest image was obtained 7/10/2021 8:22 am . 


FINDINGS:  Lung volumes are low. Bilateral airspace opacification is present 

worse in the lower left lung field. Pattern is not substantially different from 

comparison. Heart size remains prominent. Vasculature is prominent. All chest 

findings are exaggerated by the low lung volumes. No measurable pleural effusion

and no pneumothorax. No acute bony abnormality seen. No acute aortic findings 

suspected. 


IMPRESSION:  Bilateral COVID-19 pneumonia pattern mild to moderate, worse in the

lower left lung field. 


No significant change from comparison.





Physical Exam:


General: Patient alert, cooperative.  Currently on high flow 55%


HEENT: Atraumatic, PERRLA, Mucous membr. moist/pink


Neck: Supple, 2+ carotid pulse no bruit, No LAD


Respiratory: Patient much improved.  Better air movement noted.  Currently on 

55% high flow


Cardiovascular: Regular rate/rhythm, Normal S1 S2


Gastrointestinal: Normal bowel sounds, No tenderness


Musculoskeletal: No tenderness


Integumentary: No rashes


Neurological: Normal speech, Normal strength at 5/5 x4 extr, Normal tone, Normal

affect





Impression:


NSTEMI with history of CAD status post CABG 1996


Acute hypoxic respiratory failure secondary to COVID pneumonia


FERMIN superimposed on CKD 3


Diabetes mellitus type 2 with hyperglycemia


UTI/fungal with urine culture positive for Klebsiella and Proteus, Multidrug 

resistant


Hypertension


Hyperlipidemia








Plan


NSTEMI with history of CAD status post CABG 1996: Overall improved.  Patient 

doing well.  Case discussed with cardiology.  Cardiology plans for no cardiac 

intervention due to her Covid infection.  Continue Covid treatment.  This has 

improved.  Patient down to 55% high flow.  Continue to wean off.  Continue 

Plavix, Imdur, metoprolol, Lipitor for her CAD.  Blood pressure improved with 

increased dose of Norvasc.  PICC line in place.  Patient with 

UTIKlebsiella/Proteus.  Now on Merrem.  Continue with physical therapy.  

Anticipate improvement over the next several days.  Consider home discharge in 

the next 2 to 3 days with home oxygen.  Patient on day 4 of meropenem.  Recheck 

urine culture.


Acute hypoxic respiratory failure secondary to COVID pneumonia: Continue IV 

Solu-Medrol and vitamin supplementation.  Patient to complete baricitinib.  

Continue to monitor liver function.  Patient now on 55% high flow.  Continue to 

wean off high flow to nasal cannula.  Encourage proning.  Encourage incentive 

spirometer.  Pulmonology has adjusted DVT prophylaxis to Eliquis.  Continue to 

monitor improvement.


FERMIN superimposed on CKD 3: Continue to do well. Renal function back to baseline.

Encourage oral intake. Renal ultrasound to be obtained.  Continue with 

nephrology recommendation.


Diabetes mellitus type 2 with hyperglycemia: Continue Accu-Cheks.  Will monitor 

closely.  Continue Lantus.  Will make adjustments appropriately.


UTI/fungal with urine culture positive for Klebsiella and Proteus, Multidrug 

resistant: Cultures obtained.  Continue Merrem. Will need treatment of IV 

medication for one week. Day 4 today.  PICC line in place.  Recheck urine 

culture.


Hypertension: Continue medicationCardura and metoprolol.  Increase Norvasc now 

with better blood pressure control


Hyperlipidemia:  Continue home medicationLipitor.





Code status


DNR





DVT prophylaxis:


Eliquis





Advanced Care planning-30 min:


Home at [place]





Time Spent Managing Pts Care (In Minutes): 55

## 2021-07-12 LAB
ALBUMIN SERPL BCP-MCNC: 2 G/DL (ref 3.4–5)
ALP SERPL-CCNC: 88 U/L (ref 45–117)
ALT SERPL W P-5'-P-CCNC: 19 U/L (ref 12–78)
AST SERPL W P-5'-P-CCNC: 48 U/L (ref 15–37)
BUN BLD-MCNC: 39 MG/DL (ref 7–18)
CRP SERPL-MCNC: 11 MG/L (ref ?–3)
FERRITIN SERPL-MCNC: 524.4 NG/ML (ref 8–388)
GLUCOSE SERPLBLD-MCNC: 105 MG/DL (ref 74–106)
HCT VFR BLD CALC: 28 % (ref 36–45)
LYMPHOCYTES # SPEC AUTO: 0.5 K/UL (ref 0.7–4.9)
MAGNESIUM SERPL-MCNC: 2.1 MG/DL (ref 1.8–2.4)
PMV BLD: 8.9 FL (ref 7.6–11.3)
POTASSIUM SERPL-SCNC: 3.6 MMOL/L (ref 3.5–5.1)
RBC # BLD: 2.97 M/UL (ref 3.86–4.86)

## 2021-07-12 RX ADMIN — ATORVASTATIN CALCIUM SCH MG: 20 TABLET, FILM COATED ORAL at 21:05

## 2021-07-12 RX ADMIN — Medication SCH MG: at 12:37

## 2021-07-12 RX ADMIN — INSULIN GLARGINE SCH UNITS: 100 INJECTION, SOLUTION SUBCUTANEOUS at 21:07

## 2021-07-12 RX ADMIN — SODIUM CHLORIDE SCH MLS: 9 INJECTION, SOLUTION INTRAVENOUS at 08:53

## 2021-07-12 RX ADMIN — Medication SCH MG: at 16:09

## 2021-07-12 RX ADMIN — AMLODIPINE BESYLATE SCH MG: 10 TABLET ORAL at 08:55

## 2021-07-12 RX ADMIN — LIOTHYRONINE SODIUM SCH MCG: 5 TABLET ORAL at 05:35

## 2021-07-12 RX ADMIN — METHYLPREDNISOLONE SODIUM SUCCINATE SCH MG: 40 INJECTION, POWDER, FOR SOLUTION INTRAMUSCULAR; INTRAVENOUS at 01:05

## 2021-07-12 RX ADMIN — METHYLPREDNISOLONE SODIUM SUCCINATE SCH MG: 125 INJECTION, POWDER, FOR SOLUTION INTRAMUSCULAR; INTRAVENOUS at 16:08

## 2021-07-12 RX ADMIN — SODIUM CHLORIDE SCH MLS: 9 INJECTION, SOLUTION INTRAVENOUS at 21:08

## 2021-07-12 RX ADMIN — METOPROLOL TARTRATE SCH MG: 25 TABLET ORAL at 05:35

## 2021-07-12 RX ADMIN — Medication SCH ML: at 21:05

## 2021-07-12 RX ADMIN — METOPROLOL TARTRATE SCH MG: 25 TABLET ORAL at 17:37

## 2021-07-12 RX ADMIN — METHYLPREDNISOLONE SODIUM SUCCINATE SCH MG: 125 INJECTION, POWDER, FOR SOLUTION INTRAMUSCULAR; INTRAVENOUS at 08:53

## 2021-07-12 RX ADMIN — ISOSORBIDE MONONITRATE SCH MG: 30 TABLET, EXTENDED RELEASE ORAL at 17:35

## 2021-07-12 RX ADMIN — ASPIRIN SCH MG: 81 TABLET, COATED ORAL at 08:54

## 2021-07-12 RX ADMIN — DOXAZOSIN SCH MG: 2 TABLET ORAL at 08:54

## 2021-07-12 RX ADMIN — LEVOTHYROXINE SODIUM SCH MG: 112 TABLET ORAL at 05:35

## 2021-07-12 RX ADMIN — BARICITINIB SCH MG: 2 TABLET, FILM COATED ORAL at 08:56

## 2021-07-12 RX ADMIN — CLOPIDOGREL BISULFATE SCH MG: 75 TABLET, FILM COATED ORAL at 08:54

## 2021-07-12 RX ADMIN — DOXAZOSIN SCH MG: 2 TABLET ORAL at 21:04

## 2021-07-12 RX ADMIN — Medication SCH MG: at 08:54

## 2021-07-12 RX ADMIN — HUMAN INSULIN SCH UNIT: 100 INJECTION, SOLUTION SUBCUTANEOUS at 16:59

## 2021-07-12 RX ADMIN — HUMAN INSULIN SCH: 100 INJECTION, SOLUTION SUBCUTANEOUS at 07:30

## 2021-07-12 RX ADMIN — HUMAN INSULIN SCH UNIT: 100 INJECTION, SOLUTION SUBCUTANEOUS at 11:40

## 2021-07-12 RX ADMIN — ZINC SULFATE CAP 220 MG (50 MG ELEMENTAL ZN) SCH MG: 220 (50 ZN) CAP at 08:54

## 2021-07-12 RX ADMIN — HUMAN INSULIN SCH UNIT: 100 INJECTION, SOLUTION SUBCUTANEOUS at 21:07

## 2021-07-12 RX ADMIN — LEVOTHYROXINE SODIUM SCH MG: 25 TABLET ORAL at 05:35

## 2021-07-12 RX ADMIN — CHOLECALCIFEROL TAB 25 MCG (1000 UNIT) SCH UNIT: 25 TAB at 08:53

## 2021-07-12 RX ADMIN — Medication SCH MG: at 21:05

## 2021-07-12 RX ADMIN — Medication SCH ML: at 08:57

## 2021-07-12 NOTE — EKG
Test Date:    2021-07-08               Test Time:    18:47:00

Technician:                                        

                                                     

MEASUREMENT RESULTS:                                       

Intervals:                                           

Rate:         64                                     

ME:           148                                    

QRSD:         98                                     

QT:           502                                    

QTc:          517                                    

Axis:                                                

P:            19                                     

ME:           148                                    

QRS:          -23                                    

T:            83                                     

                                                     

INTERPRETIVE STATEMENTS:                                       

                                                     

Sinus rhythm with premature atrial complexes

Inferior infarct, age undetermined

Anterolateral infarct, age undetermined

Prolonged QT

Abnormal ECG

Compared to ECG 07/06/2021 15:09:17

Atrial premature complex(es) now present

Prolonged QT interval now present

Myocardial infarct finding still present



Electronically Signed On 07-12-21 16:05:55 CDT by Mukesh Yarbrough

## 2021-07-12 NOTE — RAD REPORT
EXAM DESCRIPTION:  [place] Single View7/11/2021 12:28 am

 

CLINICAL HISTORY:  76 years, Female, PICC placement

 

COMPARISON:  None.

 

FINDINGS:  Single view of the chest was obtained portable. No prior films are available for compariso
n. The lung volume is decreased. The heart is not enlarged. The thoracic aorta demonstrate intimal ca
lcification. Sternotomy wires suggest the possibility of previous CABG. There is a right upper extrem
ity PICC line tip of the catheter within the cavoatrial junction. No significant pleural effusions an
d/or consolidations. External EKG leads within the field-of-view Limits diagnosis.   The rest of the 
soft tissue and bony structures demonstrate to be unremarkable.

 

IMPRESSION:  Right upper extremity PICC line tip in good position. No focal areas of acute airspace d
isease

 

Electronically signed by:   John Ravi MD   7/11/2021 12:36 AM CDT Workstation: [id number]

 

 

Due to temporary technical issues with the PACS/Fluency reporting system, reports are being signed by
 the in house radiologist without review as a courtesy to ensure prompt reporting. The interpreting r
adiologist is fully responsible for the content of the report.

## 2021-07-12 NOTE — PN
Date of Progress Note:  07/12/2021



Subjective:  The patient is seen in intensive care unit in room 8.  The patient is alert, awake, talk
ing in full sentences.  Denies any discomfort.  States her breathing has improved.  She states that s
he does not feel any distress currently.  She does require a high level of oxygen still to keep her O
2 sats in the 90 range.  O2 sats are in mid 90 with about 50% flow oxygen ongoing.  She has had good 
intake.  She has been taking in better p.o. intake and also has improved her water intake a little bi
t compared to previous days.



Objective:  Vitals signs:  Stable.  Blood pressure last was 148/49, has been as low as 116/41, before
 that 156/69.  Her pulse is about 60 and regular.  Respirations are around 14 and comfortable.  She i
s afebrile.  O2 sats are in mid 90s, requiring oxygen. 

Lungs:  Clear to auscultation anteriorly with few wheezes that improve with cough. 

Abdomen:  Soft. 

Extremities:  Reveal no edema. 

Skin:  On the dry side.



Laboratory Data:  Reviewed.  Labs show a WBC count of 8, hemoglobin hematocrit 9.1 and 28, platelet c
ount of 281.  Chemistry shows sodium 145, this has gone up from 141.  Potassium 3.6, chloride 109, bi
carb is 30, BUN 39, creatinine 0.78, calcium level of 7.9.  C-reactive protein improved from 14.6 to 
11.  Albumin at 2.0, stable.



Assessment:  

1.The patient with COVID-19 pneumonia.  The patient with acute kidney injury in the setting of volum
e depletion, now with hypernatremia.  Eating and drinking better.  Her kidney function has improved. 
 History of coronary artery disease, history of CABG.  The patient presents with acute hypoxic respir
atory failure with COVID pneumonia.  The patient has gotten steroids.  She is on oxygen currently wit
h 50% oxygen still being used.  CRP improving.  She has acute kidney injury which has now almost reso
lved.  Her creatinine is back down to low 1.  Her sodium however has increased.

2.Acute kidney injury.

3.Hypernatremia.

4.COVID-19 pneumonia.



Plan:  Continue current supportive measures including oxygen, ICU monitoring.  The patient's conditio
n is improved, but frail.  She is on antibiotics with UTI.  For UTI, we will add a D5W at 250 cc tota
l that is 50 cc an hour for 5 hours with a small repletion to allow her to correct her sodium as she 
is eating and drinking better and recovering from respiratory failure.  That in itself would also sup
port her calcium, potassium, and her sodium.  We will recheck the basic metabolic panel tomorrow morn
ing.  Discussed the plan with Dr. Chinmay Sams and also with the patient's intensive care nurse, Abigail
.





MD/JOANN

DD:  07/12/2021 11:17:13Voice ID:  [id number]

DT:  07/12/2021 13:45:14Report ID:  [id number]

## 2021-07-12 NOTE — P.PN
Subjective


Date of Service: 07/12/21


Primary Care Provider: none- previously Dr. montiel, nephrology Dr. Mckeon


Chief Complaint: NSTEMI, hypoxemia, COVID pneumonia


Subjective: Improving (Patient continues to improve.  Currently on high flow 

50%.)





Physical Examination





- Vital Signs


Temperature: 97.9 F


Blood Pressure: 146/55


Pulse: 66


Respirations: 15


Pulse Ox (%): 95





- Studies


Microbiology Data (last 24 hrs): 








07/06/21 15:55   Blood  - Blood   Aerobic Blood Culture - Final


                            No growth in 5 days.


07/06/21 15:55   Blood  - Blood   Anaerobic Blood Culture - Final


                            No growth in 5 days.


07/06/21 15:38   Blood  - Blood   Aerobic Blood Culture - Final


                            No growth in 5 days.


07/06/21 15:38   Blood  - Blood   Anaerobic Blood Culture - Final


                            No growth in 5 days.








Assessment & Plan


Discharge Plan: Home


Plan to discharge in: Greater than 2 days


Physician Review Additional Text: 





Follow up CXR: 


COMPARISON:  None. 


FINDINGS:  Single view of the chest was obtained portable. No prior films are 

available for comparison. The lung volume is decreased. The heart is not 

enlarged. The thoracic aorta demonstrate intimal calcification. Sternotomy wires

suggest the possibility of previous CABG. There is a right upper extremity PICC 

line tip of the catheter within the cavoatrial junction. No significant pleural 

effusions and/or consolidations. External EKG leads within the field-of-view 

Limits diagnosis.   The rest of the soft tissue and bony structures demonstrate 

to be unremarkable. 


IMPRESSION:  Right upper extremity PICC line tip in good position. No focal 

areas of acute airspace disease





Physical Exam:


General: Patient alert, cooperative.  Currently on high flow 50%


HEENT: Atraumatic, PERRLA, Mucous membr. moist/pink


Neck: Supple, 2+ carotid pulse no bruit, No LAD


Respiratory: Patient much improved.  Better air movement noted.  Currently on 

50% high flow


Cardiovascular: Regular rate/rhythm, Normal S1 S2


Gastrointestinal: Normal bowel sounds, No tenderness


Musculoskeletal: No tenderness


Integumentary: No rashes


Neurological: Normal speech, Normal strength at 5/5 x4 extr, Normal tone, Normal

affect





Impression:


NSTEMI with history of CAD status post CABG 1996


Acute hypoxic respiratory failure secondary to COVID pneumonia


FERMIN superimposed on CKD 3


Diabetes mellitus type 2 with hyperglycemia


UTI, urine culture positive for Klebsiella and Proteus, Multidrug resistant


Hypertension


Hyperlipidemia








Plan


NSTEMI with history of CAD status post CABG 1996: Patient continues to improve. 

Overall stable.  Cardiology plans for no cardiac intervention due to her Covid 

infection.  Continue Covid treatment.  This has improved.  Patient down to 50%. 

Ferritin down to 524.  CRP improved to 11.  LFTs remain within normal range.  

Continue to wean off high flow.  Continue Plavix, Imdur, metoprolol, Lipitor for

her CAD.  Blood pressure stable.  PICC line in place.  No further bleeding 

noted.  Patient with UTIKlebsiella/Proteus.  Now on Merrem.  Patient on day 5 

of meropenem.  Repeat urine culture obtained.  If normal can discontinue 

antibiotics after 7-day treatment.  Continue with physical therapy.  Encourage 

ambulation, incentive spirometer.  Anticipate improvement over the next several 

days.  Consider home discharge in the next 2 to 3 days with home oxygen.  


Acute hypoxic respiratory failure secondary to COVID pneumonia: Continue IV 

Solu-Medrol and vitamin supplementation.  Patient to complete baricitinib.  

Continue to monitor liver function.  Patient down to 50% high flow.  Hopefully 

we can transition to normal cannula.  CRP and ferritin improved. Encourage 

proning.  Encourage incentive spirometer.  Eliquis was discontinued yesterday 

due to bleeding from PICC line.  Bleeding under control.  Continue with aspirin 

and Plavix only.  Recheck chest x-ray today.


FERMIN superimposed on CKD 3: Continue to do well. Renal function back to baseline.

Encourage oral intake.  Continue with nephrology recommendation.


Diabetes mellitus type 2 with hyperglycemia: Continue Accu-Cheks.  Will monitor 

closely.  Continue Lantus.  Will continue to make adjustments appropriately.


UTI, urine culture positive for Klebsiella and Proteus, Multidrug resistant: 

Repeat urine cultures obtained.  Continue Merrem. Will need treatment of IV 

medication for one week.  Currently on day 5.  PICC line in place.  No further 

bleeding noted.  If urine culture negative will discontinue meropenem after 7 

days.


Hypertension: Continue medicationCardura, Norvasc, metoprolol


Hyperlipidemia:  Continue home medicationLipitor.





Code status


DNR





DVT prophylaxis:


Aspirin and Plavix





Advanced Care planning-30 min:


Home at [place] anticipate likely need with home oxygen





Time Spent Managing Pts Care (In Minutes): 55

## 2021-07-13 LAB
ALBUMIN SERPL BCP-MCNC: 2 G/DL (ref 3.4–5)
ALP SERPL-CCNC: 92 U/L (ref 45–117)
ALT SERPL W P-5'-P-CCNC: 20 U/L (ref 12–78)
AST SERPL W P-5'-P-CCNC: 51 U/L (ref 15–37)
BUN BLD-MCNC: 38 MG/DL (ref 7–18)
CRP SERPL-MCNC: 8.73 MG/L (ref ?–3)
FERRITIN SERPL-MCNC: 485.2 NG/ML (ref 8–388)
GLUCOSE SERPLBLD-MCNC: 69 MG/DL (ref 74–106)
HCT VFR BLD CALC: 28.5 % (ref 36–45)
LYMPHOCYTES # SPEC AUTO: 0.5 K/UL (ref 0.7–4.9)
MAGNESIUM SERPL-MCNC: 2 MG/DL (ref 1.8–2.4)
PMV BLD: 8.7 FL (ref 7.6–11.3)
POTASSIUM SERPL-SCNC: 3.7 MMOL/L (ref 3.5–5.1)
RBC # BLD: 3.04 M/UL (ref 3.86–4.86)

## 2021-07-13 RX ADMIN — HUMAN INSULIN SCH UNIT: 100 INJECTION, SOLUTION SUBCUTANEOUS at 11:47

## 2021-07-13 RX ADMIN — METOPROLOL TARTRATE SCH MG: 25 TABLET ORAL at 06:04

## 2021-07-13 RX ADMIN — HUMAN INSULIN SCH: 100 INJECTION, SOLUTION SUBCUTANEOUS at 07:30

## 2021-07-13 RX ADMIN — SODIUM CHLORIDE SCH MLS: 9 INJECTION, SOLUTION INTRAVENOUS at 20:35

## 2021-07-13 RX ADMIN — LIOTHYRONINE SODIUM SCH MCG: 5 TABLET ORAL at 06:00

## 2021-07-13 RX ADMIN — AMLODIPINE BESYLATE SCH MG: 10 TABLET ORAL at 08:23

## 2021-07-13 RX ADMIN — Medication SCH ML: at 08:25

## 2021-07-13 RX ADMIN — ATORVASTATIN CALCIUM SCH MG: 20 TABLET, FILM COATED ORAL at 20:35

## 2021-07-13 RX ADMIN — CHOLECALCIFEROL TAB 25 MCG (1000 UNIT) SCH UNIT: 25 TAB at 08:25

## 2021-07-13 RX ADMIN — ZINC SULFATE CAP 220 MG (50 MG ELEMENTAL ZN) SCH MG: 220 (50 ZN) CAP at 08:24

## 2021-07-13 RX ADMIN — INSULIN GLARGINE SCH UNITS: 100 INJECTION, SOLUTION SUBCUTANEOUS at 20:31

## 2021-07-13 RX ADMIN — METHYLPREDNISOLONE SODIUM SUCCINATE SCH MG: 125 INJECTION, POWDER, FOR SOLUTION INTRAMUSCULAR; INTRAVENOUS at 00:48

## 2021-07-13 RX ADMIN — METHYLPREDNISOLONE SODIUM SUCCINATE SCH MG: 125 INJECTION, POWDER, FOR SOLUTION INTRAMUSCULAR; INTRAVENOUS at 16:31

## 2021-07-13 RX ADMIN — LEVOTHYROXINE SODIUM SCH MG: 25 TABLET ORAL at 06:00

## 2021-07-13 RX ADMIN — METOPROLOL TARTRATE SCH MG: 25 TABLET ORAL at 17:10

## 2021-07-13 RX ADMIN — Medication SCH MG: at 16:31

## 2021-07-13 RX ADMIN — Medication SCH MG: at 08:32

## 2021-07-13 RX ADMIN — BARICITINIB SCH MG: 2 TABLET, FILM COATED ORAL at 08:25

## 2021-07-13 RX ADMIN — ISOSORBIDE MONONITRATE SCH MG: 30 TABLET, EXTENDED RELEASE ORAL at 17:03

## 2021-07-13 RX ADMIN — HUMAN INSULIN SCH UNIT: 100 INJECTION, SOLUTION SUBCUTANEOUS at 17:03

## 2021-07-13 RX ADMIN — METHYLPREDNISOLONE SODIUM SUCCINATE SCH MG: 125 INJECTION, POWDER, FOR SOLUTION INTRAMUSCULAR; INTRAVENOUS at 08:23

## 2021-07-13 RX ADMIN — CLOPIDOGREL BISULFATE SCH MG: 75 TABLET, FILM COATED ORAL at 08:25

## 2021-07-13 RX ADMIN — Medication SCH ML: at 20:36

## 2021-07-13 RX ADMIN — DOXAZOSIN SCH MG: 2 TABLET ORAL at 20:36

## 2021-07-13 RX ADMIN — Medication SCH MG: at 20:35

## 2021-07-13 RX ADMIN — Medication SCH MG: at 08:23

## 2021-07-13 RX ADMIN — SODIUM CHLORIDE SCH MLS: 9 INJECTION, SOLUTION INTRAVENOUS at 08:23

## 2021-07-13 RX ADMIN — HUMAN INSULIN SCH UNIT: 100 INJECTION, SOLUTION SUBCUTANEOUS at 20:31

## 2021-07-13 RX ADMIN — Medication SCH MG: at 12:23

## 2021-07-13 RX ADMIN — LEVOTHYROXINE SODIUM SCH MG: 112 TABLET ORAL at 05:59

## 2021-07-13 RX ADMIN — Medication PRN MG: at 20:35

## 2021-07-13 RX ADMIN — ASPIRIN SCH MG: 81 TABLET, COATED ORAL at 08:25

## 2021-07-13 RX ADMIN — DOXAZOSIN SCH MG: 2 TABLET ORAL at 08:24

## 2021-07-13 NOTE — P.PN
Subjective


Date of Service: 07/13/21 (TV)


Primary Care Provider: none- previously Dr. montiel, nephrology Dr. Mckeon


Chief Complaint: NSTEMI, hypoxemia, COVID pneumonia


Subjective: Improving


Doing well/ Requirin 3 l/min of O2/ Eating. Resistant Kleb in urine





Physical Examination





- Vital Signs


Temperature: 98.2 F


Blood Pressure: 111/48


Pulse: 100


Respirations: 29


Pulse Ox (%): 91





- Physical Exam


General: Alert, Cooperative





Assessment & Plan





- Problems (Diagnosis)


(1) Acute respiratory failure due to severe acute respiratory syndrome 

coronavirus 2 (SARS-CoV-2) infection


Current Visit: Yes   Status: Acute   


Plan: 


Doing muh better. Requiring 3l/min O2/ Resistant KLeb in urine/ LAbs 

reviewed/change to PO pred plan to DC am/ low dose pred 10 BID for 15 days 

should be adequate F/u eith me 2 wks





Physician Review Additional Text: 





Follow up CXR: 


COMPARISON:  None. 


FINDINGS:  Single view of the chest was obtained portable. No prior films are 

available for comparison. The lung volume is decreased. The heart is not 

enlarged. The thoracic aorta demonstrate intimal calcification. Sternotomy wires

suggest the possibility of previous CABG. There is a right upper extremity PICC 

line tip of the catheter within the cavoatrial junction. No significant pleural 

effusions and/or consolidations. External EKG leads within the field-of-view 

Limits diagnosis.   The rest of the soft tissue and bony structures demonstrate 

to be unremarkable. 


IMPRESSION:  Right upper extremity PICC line tip in good position. No focal 

areas of acute airspace disease





Physical Exam:


General: Patient alert, cooperative.  Currently on 3.5 L


HEENT: Atraumatic, PERRLA, Mucous membr. moist/pink


Neck: Supple, 2+ carotid pulse no bruit, No LAD


Respiratory: Patient much improved.  Better air movement noted.  Currently on 

3.5 L


Cardiovascular: Regular rate/rhythm, Normal S1 S2


Gastrointestinal: Normal bowel sounds, No tenderness


Musculoskeletal: No tenderness


Integumentary: No rashes


Neurological: Normal speech, Normal strength at 5/5 x4 extr, Normal tone, Normal

affect





Impression:


NSTEMI with history of CAD status post CABG 1996


Acute hypoxic respiratory failure secondary to COVID pneumonia


FERMIN superimposed on CKD 3


Diabetes mellitus type 2 with hyperglycemia


UTI, urine culture positive for Klebsiella and Proteus, Multidrug resistant


Hypertension


Hyperlipidemia








Plan


NSTEMI with history of CAD status post CABG 1996: Patient continues to improve. 

Overall stable.  Cardiology plans for no cardiac intervention due to her Covid 

infection.  Continue Covid treatment.  This has improved.  Patient down to 3.5 

L.  Ferritin and CRP continue to improve.   LFTs remain within normal range.  

Continue to wean off high flow.  Continue Plavix, Imdur, metoprolol, Lipitor for

her CAD.  Blood pressure stable.  PICC line in place.  No further bleeding 

noted.  Patient with UTIKlebsiella/Proteus.  Now on Merrem.  Patient on day 6 

of meropenem.  Repeat urine culture obtained.  If normal can discontinue 

antibiotics after 7-day treatment.  Continue with physical therapy.  Encourage 

ambulation, incentive spirometer.  Anticipate improvement over the next several 

days.  Consider home discharge in the next 2 to 3 days with home oxygen.  


Acute hypoxic respiratory failure secondary to COVID pneumonia: Continue IV 

Solu-Medrol and vitamin supplementation.  Patient to complete baricitinib.  C

ontinue to monitor liver function.  Patient down to 50% high flow.  Hopefully we

can transition to normal cannula.  CRP and ferritin improved. Encourage proning.

 Encourage incentive spirometer.  Eliquis was discontinued yesterday due to 

bleeding from PICC line.  Bleeding under control.  Continue with aspirin and 

Plavix only.  Recheck chest x-ray today.


FERMIN superimposed on CKD 3: Continue to do well. Renal function back to baseline.

Encourage oral intake.  Continue with nephrology recommendation.


Diabetes mellitus type 2 with hyperglycemia: Continue Accu-Cheks.  Will monitor 

closely.  Continue Lantus.  Will continue to make adjustments appropriately.


UTI, urine culture positive for Klebsiella and Proteus, Multidrug resistant: 

Repeat urine cultures obtained.  Continue Merrem. Will need treatment of IV 

medication for one week.  Currently on day 6.  PICC line in place.  No further 

bleeding noted.  If urine culture negative will discontinue meropenem after 7 

days.


Hypertension: Continue medicationCardura, Norvasc, metoprolol


Hyperlipidemia:  Continue home medicationLipitor.





Code status


DNR





DVT prophylaxis:


Aspirin and Plavix





Advanced Care planning-30 min:


Dissipate home likely discharge Thursday or Friday.  Anticipate need for home 

oxygen at discharge.

## 2021-07-13 NOTE — P.PN
Subjective


Date of Service: 07/13/21


Primary Care Provider: none- previously Dr. montiel, nephrology Dr. Mckeon


Chief Complaint: NSTEMI, hypoxemia, COVID pneumonia


Subjective: Improving, Doing well





Physical Examination





- Vital Signs


Temperature: 96.8 F


Blood Pressure: 135/41


Pulse: 67


Respirations: 14


Pulse Ox (%): 94





Assessment & Plan


Discharge Plan: Home


Plan to discharge in: 72 Hours


Physician Review Additional Text: 





Follow up CXR: 


COMPARISON:  None. 


FINDINGS:  Single view of the chest was obtained portable. No prior films are 

available for comparison. The lung volume is decreased. The heart is not 

enlarged. The thoracic aorta demonstrate intimal calcification. Sternotomy wires

suggest the possibility of previous CABG. There is a right upper extremity PICC 

line tip of the catheter within the cavoatrial junction. No significant pleural 

effusions and/or consolidations. External EKG leads within the field-of-view 

Limits diagnosis.   The rest of the soft tissue and bony structures demonstrate 

to be unremarkable. 


IMPRESSION:  Right upper extremity PICC line tip in good position. No focal 

areas of acute airspace disease





Physical Exam:


General: Patient alert, cooperative.  Currently on 3.5 L


HEENT: Atraumatic, PERRLA, Mucous membr. moist/pink


Neck: Supple, 2+ carotid pulse no bruit, No LAD


Respiratory: Patient much improved.  Better air movement noted.  Currently on 

3.5 L


Cardiovascular: Regular rate/rhythm, Normal S1 S2


Gastrointestinal: Normal bowel sounds, No tenderness


Musculoskeletal: No tenderness


Integumentary: No rashes


Neurological: Normal speech, Normal strength at 5/5 x4 extr, Normal tone, Normal

affect





Impression:


NSTEMI with history of CAD status post CABG 1996


Acute hypoxic respiratory failure secondary to COVID pneumonia


FERMIN superimposed on CKD 3


Diabetes mellitus type 2 with hyperglycemia


UTI, urine culture positive for Klebsiella and Proteus, Multidrug resistant


Hypertension


Hyperlipidemia








Plan


NSTEMI with history of CAD status post CABG 1996: Patient continues to improve. 

Overall stable.  Cardiology plans for no cardiac intervention due to her Covid 

infection.  Continue Covid treatment.  This has improved.  Patient down to 3.5 

L.  Ferritin and CRP continue to improve.   LFTs remain within normal range.  

Continue to wean off high flow.  Continue Plavix, Imdur, metoprolol, Lipitor for

her CAD.  Blood pressure stable.  PICC line in place.  No further bleeding 

noted.  Patient with UTIKlebsiella/Proteus.  Now on Merrem.  Patient on day 6 

of meropenem.  Repeat urine culture obtained.  If normal can discontinue 

antibiotics after 7-day treatment.  Continue with physical therapy.  Encourage 

ambulation, incentive spirometer.  Anticipate improvement over the next several 

days.  Consider home discharge in the next 2 to 3 days with home oxygen.  


Acute hypoxic respiratory failure secondary to COVID pneumonia: Continue IV 

Solu-Medrol and vitamin supplementation.  Patient to complete baricitinib.  

Continue to monitor liver function.  Patient down to 50% high flow.  Hopefully 

we can transition to normal cannula.  CRP and ferritin improved. Encourage 

proning.  Encourage incentive spirometer.  Eliquis was discontinued yesterday 

due to bleeding from PICC line.  Bleeding under control.  Continue with aspirin 

and Plavix only.  Recheck chest x-ray today.


FERMIN superimposed on CKD 3: Continue to do well. Renal function back to baseline.

Encourage oral intake.  Continue with nephrology recommendation.


Diabetes mellitus type 2 with hyperglycemia: Continue Accu-Cheks.  Will monitor 

closely.  Continue Lantus.  Will continue to make adjustments appropriately.


UTI, urine culture positive for Klebsiella and Proteus, Multidrug resistant: 

Repeat urine cultures obtained.  Continue Merrem. Will need treatment of IV 

medication for one week.  Currently on day 6.  PICC line in place.  No further 

bleeding noted.  If urine culture negative will discontinue meropenem after 7 

days.


Hypertension: Continue medicationCardura, Norvasc, metoprolol


Hyperlipidemia:  Continue home medicationLipitor.





Code status


DNR





DVT prophylaxis:


Aspirin and Plavix





Advanced Care planning-30 min:


Dissipate home likely discharge Thursday or Friday.  Anticipate need for home 

oxygen at discharge.





Time Spent Managing Pts Care (In Minutes): 55

## 2021-07-13 NOTE — P.PN
Date of Service: 07/10/21


Vital Signs











Temp Pulse Resp BP Pulse Ox


 


 96.8 F   76   14   123/34 L  94 


 


 07/13/21 13:11  07/13/21 17:10  07/13/21 13:11  07/13/21 17:10  07/13/21 13:11





Medications





Acetaminophen (Acetaminophen 500 Mg Tab)  500 mg PO Q4HP PRN


   PRN Reason: Pain scale 2-4 (Mild)


   Last Admin: 07/11/21 00:38 Dose:  500 mg


   Documented by: 


Allopurinol (Allopurinol 300 Mg Tab)  300 mg PO DAILY [facility]


   Last Admin: 07/13/21 08:25 Dose:  300 mg


   Documented by: 


Amlodipine Besylate (Amlodipine 10 Mg Tab)  10 mg PO DAILY [facility]


   Last Admin: 07/13/21 08:23 Dose:  10 mg


   Documented by: 


Ascorbic Acid (Ascorbic Acid 500 Mg Tablet)  500 mg PO QID [facility]


   Last Admin: 07/13/21 16:31 Dose:  500 mg


   Documented by: 


Aspirin (Aspirin Ec 81 Mg Tab)  81 mg PO DAILY [facility]


   Last Admin: 07/13/21 08:25 Dose:  81 mg


   Documented by: 


Atorvastatin Calcium (Atorvastatin 20 Mg Tab)  20 mg PO BEDTIME [facility]


   Last Admin: 07/12/21 21:05 Dose:  20 mg


   Documented by: 


Benzonatate (Benzonatate 100 Mg Cap)  100 mg PO TID PRN


   PRN Reason: COUGH


   Last Admin: 07/08/21 06:03 Dose:  100 mg


   Documented by: 


Cholecalciferol (Vitamin D 1000 Unit Tab)  2,000 unit PO DAILY [facility]


   Last Admin: 07/13/21 08:25 Dose:  2,000 unit


   Documented by: 


Clopidogrel Bisulfate (Clopidogrel 75 Mg Tablet)  75 mg PO DAILY [facility]


   Last Admin: 07/13/21 08:25 Dose:  75 mg


   Documented by: 


Dextrose (D50w 25 Gm/50 Ml Vial)  12.5 gm IV PRN PRN; Protocol


   PRN Reason: HYPOGLYCEMIA


Doxazosin Mesylate (Doxazosin 2 Mg Tab)  2 mg PO BID [facility]


   Last Admin: 07/13/21 08:24 Dose:  2 mg


   Documented by: 


Glucagon (Glucagon 1 Mg/Vial)  1 mg IM 1X PRN; Protocol


   PRN Reason: HYPOGLYCEMIA


Meropenem 1,000 mg/ Sodium (Chloride)  100 mls @ 200 mls/hr IV Q12HR [facility]


   Last Admin: 07/13/21 08:23 Dose:  100 mls


   Documented by: 


Insulin Glargine (Insulin Glargine 100 Units/Ml)  20 units SQ BEDTIME [facility]


   Last Admin: 07/12/21 21:07 Dose:  20 units


   Documented by: 


Insulin Human Regular (Insulin -Regular Human 50 Unit/0.5 Ml Ml)  0 unit SQ ACHS

[facility]; Protocol


   Last Admin: 07/13/21 17:03 Dose:  9 unit


   Documented by: 


Isosorbide Mononitrate (Isosorbide Mono Sr 30 Mg Tab)  30 mg PO DAILY AFTER 

SUPPER [facility]


   Last Admin: 07/13/21 17:03 Dose:  30 mg


   Documented by: 


Levothyroxine Sodium (Levothyroxine Sod 0.112 Mg Tab)  0.112 mg PO DAILYAC [facility]


   Last Admin: 07/13/21 05:59 Dose:  0.112 mg


   Documented by: 


Levothyroxine Sodium (Levothyroxine Sod 0.025 Mg Tab)  0.025 mg PO DAILYAC [facility]


   Last Admin: 07/13/21 06:00 Dose:  0.025 mg


   Documented by: 


Liothyronine Sodium (Liothyronine Sod 5 Mcg Tab)  5 mcg PO DAILYAC [facility]


   Last Admin: 07/13/21 06:00 Dose:  5 mcg


   Documented by: 


Melatonin (Melatonin 5 Mg Tablet)  5 mg PO BEDTIME PRN PRN


   PRN Reason: INSOMNIA


   Last Admin: 07/11/21 20:22 Dose:  5 mg


   Documented by: 


Methylprednisolone Sodium Succinate (Methylprednisolone 125 Mg Inj)  80 mg IV 

Q8HR [facility]


   Last Admin: 07/13/21 16:31 Dose:  80 mg


   Documented by: 


Metoprolol Tartrate (Metoprolol Tar 25 Mg Tab)  25 mg PO BID 6AM 6PM [facility]


   Last Admin: 07/13/21 17:10 Dose:  25 mg


   Documented by: 


Ondansetron HCl (Ondansetron 4 Mg/2 Ml Vial)  4 mg IV Q6HP PRN


   PRN Reason: NAUSEA / VOMITING


Sodium Chloride (Flush Normal Saline 10 Ml)  10 ml IV BID [facility]


   Last Admin: 07/13/21 08:25 Dose:  10 ml


   Documented by: 


Thiamine HCl (Thiamine Hcl 100 Mg Tablet)  200 mg PO DAILY [facility]


   Last Admin: 07/13/21 08:32 Dose:  200 mg


   Documented by: 


Zinc Sulfate (Zinc Sulfate 220 Mg Cap)  220 mg PO DAILY [facility]


   Last Admin: 07/13/21 08:24 Dose:  220 mg


   Documented by: 


Microbiology Results





07/06/21 15:55   Blood  - Blood   Aerobic Blood Culture - Final


                            No growth in 5 days.


07/06/21 15:55   Blood  - Blood   Anaerobic Blood Culture - Final


                            No growth in 5 days.


07/06/21 15:38   Blood  - Blood   Aerobic Blood Culture - Final


                            No growth in 5 days.


07/06/21 15:38   Blood  - Blood   Anaerobic Blood Culture - Final


                            No growth in 5 days.


07/06/21 16:15   Clean Catch Urine   [place] Count - Final


                            >100,000 CFU/ML.


07/06/21 16:15   Clean Catch Urine    - Final


                            Klebsiella Pneumoniae


                            Proteus Mirabilis





Assessment/ Plan: 


Nephrology





Feeling better today.


Dyspnea with exertion.  Persistent hypoxia.


No acute events overnight.





Vitals, medications, blood work and imaging reviewed in the chart.


General: Oriented x3, Cooperative


HEENT: Atraumatic


Neck: Supple


Respiratory: CTA


Cardiovascular: No edema, Regular rate/rhythm


Gastrointestinal: Soft and benign, Non-distended


Musculoskeletal: No clubbing, No contractures


Integumentary: No rashes, No cyanosis


Neurological: Normal speech





Laboratory Data (last 24 hrs)


07/06/21 15:38: PT 12.1, INR 1.05, APTT 24.4


07/06/21 15:38: WBC 9.50  D, Hgb 11.5 L, Hct 35.1 L, Plt Count 248  D


07/06/21 15:38: Sodium 130 L, Potassium 4.8, BUN 56 H D, Creatinine 2.16 H, 

Glucose 357 H, Total Bilirubin 0.6, AST 42 H, ALT 17, Alkaline Phosphatase 124 

H, Lipase 11 L





Imagings Data: 


EXAM DESCRIPTION:  RAD - Chest Single View - 7/6/2021 3:20 pm


CLINICAL HISTORY:  Weakness, COVID positive


COMPARISON:  June 30


TECHNIQUE:  AP portable chest image was obtained 7/6/2021 3:20 pm .


FINDINGS:  Lung volumes are low. Interstitial opacification is evident similar 

to comparison. No dense consolidation. There are some scattered alveolar 

opacities. Pattern is not specific but can be seen with a mild COVID-19 

pneumonia.


Sternotomy wires are in place. Trachea is midline. Heart and vasculature are 

normal. No measurable pleural effusion and no pneumothorax. No acute bony 

abnormality seen. No acute aortic findings suspected.


IMPRESSION:  Interstitial and patchy alveolar opacities are present.


Pattern is nonspecific but could indicate a mild COVID-19 pneumonia.





Conclusions/Impression: 


FERMIN likely due to hypovolemia


CKD III with proteinuria


-No NSAIDs


-Discontinue IVF 1/2NS





Hyponatremia


-Encourage nutrition


-Hold HCTZ





Hypokalemia


-Replete potassium prn





Hypocalcemia


-Continue Vitamin D3





HTN with CKD


-Continue Metoprolol





DM II with CKD


-RISS


-Hold Metformin





Moderate malnutrition


-Encourage nutrition





Anemia in chronic illness


-Monitor H&H





Acute GNR Cystitis


-Abx changed to Meropenem





COVID-19 PNA


-Continue steroids


-Agree with anticoagulation


-Continue Oxygen supplementation





Case reviewed with Dr. Sams


Late entry due to computer access problems.

## 2021-07-13 NOTE — PN
Date of Progress Note:  07/13/2021



The patient is seen in room 8 in Intensive Care Unit.



Subjective:  She is awake, alert, talking comfortably.  Says her breathing is stable since yesterday.
  She is looking forward to going back home once her treatment is somewhat completed in the hospital.
  She is hoping to be home by Friday.  She does understand her condition is still frail.  States some
 other family members also had COVID, but they are quarantined now.  The patient's lab work from [facility] reviewed.  Last evening, sodium was still 144.  The patient is not mentioning any decline in he
r breathing.  She is still on oxygen, but stable.



Objective:  Vitals signs:  Stable.  Blood pressure is looking good at 135/41, pulse is about 70 and r
egular, respirations around 14 and comfortable. 

Lungs:  Clear to auscultation. 

Abdomen:  Soft. 

Extremities:  Reveal no edema. 

HEART:  Sounds are regular.



Laboratory Data:  Shows from last evening, sodium 144, potassium 3.7, chloride 109, bicarb 30, BUN 38
, creatinine 0.83.



Assessment And Plan:  The patient with COVID-19 pneumonia, history of coronary artery disease, CABG. 
 Volume status seems close to euvolemic.  Breathing about the same as yesterday.  Recovering from COV
ID pneumonia.  Continue oxygen.  Continue supportive care.  Sodium is still on the higher side.  The 
patient is not taking in good p.o. intake.  Continue to encourage adequate water intake.  We will giv
e 

another dose of D5W at 50 cc an hour for 250 cc.  Recheck basic metabolic panel in the morning.





MD/JOANN

DD:  07/13/2021 11:49:29Voice ID:  [id number]

DT:  07/13/2021 14:52:21Report ID:  [id number]

## 2021-07-14 LAB
ALBUMIN SERPL BCP-MCNC: 2 G/DL (ref 3.4–5)
ALP SERPL-CCNC: 81 U/L (ref 45–117)
ALT SERPL W P-5'-P-CCNC: 20 U/L (ref 12–78)
AST SERPL W P-5'-P-CCNC: 44 U/L (ref 15–37)
BUN BLD-MCNC: 41 MG/DL (ref 7–18)
CRP SERPL-MCNC: 6.51 MG/L (ref ?–3)
FERRITIN SERPL-MCNC: 472.3 NG/ML (ref 8–388)
GLUCOSE SERPLBLD-MCNC: 86 MG/DL (ref 74–106)
HCT VFR BLD CALC: 27.7 % (ref 36–45)
LYMPHOCYTES # SPEC AUTO: 0.5 K/UL (ref 0.7–4.9)
MAGNESIUM SERPL-MCNC: 2 MG/DL (ref 1.8–2.4)
PMV BLD: 8.5 FL (ref 7.6–11.3)
POTASSIUM SERPL-SCNC: 3.7 MMOL/L (ref 3.5–5.1)
RBC # BLD: 2.94 M/UL (ref 3.86–4.86)

## 2021-07-14 RX ADMIN — Medication SCH ML: at 09:22

## 2021-07-14 RX ADMIN — ZINC SULFATE CAP 220 MG (50 MG ELEMENTAL ZN) SCH MG: 220 (50 ZN) CAP at 09:22

## 2021-07-14 RX ADMIN — CLOPIDOGREL BISULFATE SCH MG: 75 TABLET, FILM COATED ORAL at 09:22

## 2021-07-14 RX ADMIN — ISOSORBIDE MONONITRATE SCH MG: 30 TABLET, EXTENDED RELEASE ORAL at 17:08

## 2021-07-14 RX ADMIN — DOXAZOSIN SCH MG: 2 TABLET ORAL at 09:21

## 2021-07-14 RX ADMIN — METOPROLOL TARTRATE SCH MG: 25 TABLET ORAL at 17:08

## 2021-07-14 RX ADMIN — HUMAN INSULIN SCH UNIT: 100 INJECTION, SOLUTION SUBCUTANEOUS at 17:08

## 2021-07-14 RX ADMIN — LEVOTHYROXINE SODIUM SCH MG: 112 TABLET ORAL at 05:40

## 2021-07-14 RX ADMIN — HUMAN INSULIN SCH UNIT: 100 INJECTION, SOLUTION SUBCUTANEOUS at 20:58

## 2021-07-14 RX ADMIN — Medication SCH MG: at 17:08

## 2021-07-14 RX ADMIN — INSULIN GLARGINE SCH UNITS: 100 INJECTION, SOLUTION SUBCUTANEOUS at 20:59

## 2021-07-14 RX ADMIN — ACETAMINOPHEN PRN MG: 500 TABLET, FILM COATED ORAL at 17:14

## 2021-07-14 RX ADMIN — LEVOTHYROXINE SODIUM SCH MG: 25 TABLET ORAL at 05:40

## 2021-07-14 RX ADMIN — AMLODIPINE BESYLATE SCH MG: 10 TABLET ORAL at 09:22

## 2021-07-14 RX ADMIN — SODIUM CHLORIDE SCH MLS: 9 INJECTION, SOLUTION INTRAVENOUS at 21:02

## 2021-07-14 RX ADMIN — Medication SCH MG: at 09:22

## 2021-07-14 RX ADMIN — LEVOTHYROXINE SODIUM SCH MG: 112 TABLET ORAL at 05:39

## 2021-07-14 RX ADMIN — Medication SCH MG: at 21:00

## 2021-07-14 RX ADMIN — DOXAZOSIN SCH MG: 2 TABLET ORAL at 21:00

## 2021-07-14 RX ADMIN — Medication SCH ML: at 20:59

## 2021-07-14 RX ADMIN — ATORVASTATIN CALCIUM SCH MG: 20 TABLET, FILM COATED ORAL at 21:00

## 2021-07-14 RX ADMIN — ASPIRIN SCH MG: 81 TABLET, COATED ORAL at 09:22

## 2021-07-14 RX ADMIN — CHOLECALCIFEROL TAB 25 MCG (1000 UNIT) SCH UNIT: 25 TAB at 09:22

## 2021-07-14 RX ADMIN — Medication SCH MG: at 12:08

## 2021-07-14 RX ADMIN — BARICITINIB SCH MG: 2 TABLET, FILM COATED ORAL at 09:21

## 2021-07-14 RX ADMIN — LIOTHYRONINE SODIUM SCH MCG: 5 TABLET ORAL at 05:40

## 2021-07-14 RX ADMIN — METOPROLOL TARTRATE SCH MG: 25 TABLET ORAL at 05:39

## 2021-07-14 RX ADMIN — HUMAN INSULIN SCH: 100 INJECTION, SOLUTION SUBCUTANEOUS at 07:30

## 2021-07-14 RX ADMIN — SODIUM CHLORIDE SCH MLS: 9 INJECTION, SOLUTION INTRAVENOUS at 09:21

## 2021-07-14 RX ADMIN — HUMAN INSULIN SCH: 100 INJECTION, SOLUTION SUBCUTANEOUS at 11:30

## 2021-07-14 NOTE — P.PN
Date of Service: 07/14/21


Vital Signs











Temp Pulse Resp BP Pulse Ox


 


 97.4 F   67   16   126/43 L  96 


 


 07/14/21 20:00  07/14/21 21:00  07/14/21 20:00  07/14/21 21:00  07/14/21 20:00





Medications





Acetaminophen (Acetaminophen 500 Mg Tab)  500 mg PO Q4HP PRN


   PRN Reason: Pain scale 2-4 (Mild)


   Last Admin: 07/14/21 17:14 Dose:  500 mg


   Documented by: 


Allopurinol (Allopurinol 300 Mg Tab)  300 mg PO DAILY [facility]


   Last Admin: 07/14/21 09:00 Dose:  300 mg


   Documented by: 


Amlodipine Besylate (Amlodipine 10 Mg Tab)  10 mg PO DAILY [facility]


   Last Admin: 07/14/21 09:22 Dose:  10 mg


   Documented by: 


Ascorbic Acid (Ascorbic Acid 500 Mg Tablet)  500 mg PO QID [facility]


   Last Admin: 07/14/21 21:00 Dose:  500 mg


   Documented by: 


Aspirin (Aspirin Ec 81 Mg Tab)  81 mg PO DAILY [facility]


   Last Admin: 07/14/21 09:22 Dose:  81 mg


   Documented by: 


Atorvastatin Calcium (Atorvastatin 20 Mg Tab)  20 mg PO BEDTIME [facility]


   Last Admin: 07/14/21 21:00 Dose:  20 mg


   Documented by: 


Benzonatate (Benzonatate 100 Mg Cap)  100 mg PO TID PRN


   PRN Reason: COUGH


   Last Admin: 07/08/21 06:03 Dose:  100 mg


   Documented by: 


Cholecalciferol (Vitamin D 1000 Unit Tab)  2,000 unit PO DAILY [facility]


   Last Admin: 07/14/21 09:22 Dose:  2,000 unit


   Documented by: 


Clopidogrel Bisulfate (Clopidogrel 75 Mg Tablet)  75 mg PO DAILY [facility]


   Last Admin: 07/14/21 09:22 Dose:  75 mg


   Documented by: 


Dextrose (D50w 25 Gm/50 Ml Vial)  12.5 gm IV PRN PRN; Protocol


   PRN Reason: HYPOGLYCEMIA


Doxazosin Mesylate (Doxazosin 2 Mg Tab)  2 mg PO BID [facility]


   Last Admin: 07/14/21 21:00 Dose:  2 mg


   Documented by: 


Glucagon (Glucagon 1 Mg/Vial)  1 mg IM 1X PRN; Protocol


   PRN Reason: HYPOGLYCEMIA


Meropenem 1,000 mg/ Sodium (Chloride)  100 mls @ 200 mls/hr IV Q12HR [facility]


   Last Admin: 07/14/21 21:02 Dose:  100 mls


   Documented by: 


Insulin Glargine (Insulin Glargine 100 Units/Ml)  20 units SQ BEDTIME [facility]


   Last Admin: 07/14/21 20:59 Dose:  20 units


   Documented by: 


Insulin Human Regular (Insulin -Regular Human 50 Unit/0.5 Ml Ml)  0 unit SQ ACHS

[facility]; Protocol


   Last Admin: 07/14/21 20:58 Dose:  7 unit


   Documented by: 


Isosorbide Mononitrate (Isosorbide Mono Sr 30 Mg Tab)  30 mg PO DAILY AFTER 

SUPPER [facility]


   Last Admin: 07/14/21 17:08 Dose:  30 mg


   Documented by: 


Levothyroxine Sodium (Levothyroxine Sod 0.112 Mg Tab)  0.112 mg PO DAILYAC [facility]


   Last Admin: 07/14/21 05:40 Dose:  0.112 mg


   Documented by: 


Levothyroxine Sodium (Levothyroxine Sod 0.025 Mg Tab)  0.025 mg PO DAILYAC [facility]


   Last Admin: 07/14/21 05:40 Dose:  0.025 mg


   Documented by: 


Liothyronine Sodium (Liothyronine Sod 5 Mcg Tab)  5 mcg PO DAILYAC [facility]


   Last Admin: 07/14/21 05:40 Dose:  5 mcg


   Documented by: 


Melatonin (Melatonin 5 Mg Tablet)  5 mg PO BEDTIME PRN PRN


   PRN Reason: INSOMNIA


   Last Admin: 07/13/21 20:35 Dose:  5 mg


   Documented by: 


Metoprolol Tartrate (Metoprolol Tar 25 Mg Tab)  25 mg PO BID 6AM 6PM [facility]


   Last Admin: 07/14/21 17:08 Dose:  25 mg


   Documented by: 


Nutritional Formula (Ensure High Protein 237 Ml Can)  237 ml PO TID PRN


   PRN Reason: IF < 50% OF MEAL CONSUMED


Ondansetron HCl (Ondansetron 4 Mg/2 Ml Vial)  4 mg IV Q6HP PRN


   PRN Reason: NAUSEA / VOMITING


Prednisone (Prednisone 20 Mg Tab)  20 mg PO BID [facility]


   Last Admin: 07/14/21 21:00 Dose:  20 mg


   Documented by: 


Sodium Chloride (Flush Normal Saline 10 Ml)  10 ml IV BID [facility]


   Last Admin: 07/14/21 20:59 Dose:  10 ml


   Documented by: 


Thiamine HCl (Thiamine Hcl 100 Mg Tablet)  200 mg PO DAILY [facility]


   Last Admin: 07/14/21 09:22 Dose:  200 mg


   Documented by: 


Zinc Sulfate (Zinc Sulfate 220 Mg Cap)  220 mg PO DAILY [facility]


   Last Admin: 07/14/21 09:22 Dose:  220 mg


   Documented by: 


Microbiology Results





07/06/21 15:55   Blood  - Blood   Aerobic Blood Culture - Final


                            No growth in 5 days.


07/06/21 15:55   Blood  - Blood   Anaerobic Blood Culture - Final


                            No growth in 5 days.


07/06/21 15:38   Blood  - Blood   Aerobic Blood Culture - Final


                            No growth in 5 days.


07/06/21 15:38   Blood  - Blood   Anaerobic Blood Culture - Final


                            No growth in 5 days.


07/06/21 16:15   Clean Catch Urine   [place] Count - Final


                            >100,000 CFU/ML.


07/06/21 16:15   Clean Catch Urine    - Final


                            Klebsiella Pneumoniae


                            Proteus Mirabilis





Assessment/ Plan: 


Nephrology





Feeling better today.


Dyspnea with exertion.  Persistent hypoxia.


No acute events overnight.





Vitals, medications, blood work and imaging reviewed in the chart.


General: Oriented x3, Cooperative


HEENT: Atraumatic


Neck: Supple


Respiratory: CTA


Cardiovascular: No edema, Regular rate/rhythm


Gastrointestinal: Soft and benign, Non-distended


Musculoskeletal: No clubbing, No contractures


Integumentary: No rashes, No cyanosis


Neurological: Normal speech





Laboratory Data (last 24 hrs)


07/06/21 15:38: PT 12.1, INR 1.05, APTT 24.4


07/06/21 15:38: WBC 9.50  D, Hgb 11.5 L, Hct 35.1 L, Plt Count 248  D


07/06/21 15:38: Sodium 130 L, Potassium 4.8, BUN 56 H D, Creatinine 2.16 H, 

Glucose 357 H, Total Bilirubin 0.6, AST 42 H, ALT 17, Alkaline Phosphatase 124 

H, Lipase 11 L





Imagings Data: 


EXAM DESCRIPTION:  RAD - Chest Single View - 7/6/2021 3:20 pm


CLINICAL HISTORY:  Weakness, COVID positive


COMPARISON:  June 30


TECHNIQUE:  AP portable chest image was obtained 7/6/2021 3:20 pm .


FINDINGS:  Lung volumes are low. Interstitial opacification is evident similar 

to comparison. No dense consolidation. There are some scattered alveolar 

opacities. Pattern is not specific but can be seen with a mild COVID-19 

pneumonia.


Sternotomy wires are in place. Trachea is midline. Heart and vasculature are 

normal. No measurable pleural effusion and no pneumothorax. No acute bony 

abnormality seen. No acute aortic findings suspected.


IMPRESSION:  Interstitial and patchy alveolar opacities are present.


Pattern is nonspecific but could indicate a mild COVID-19 pneumonia.





Conclusions/Impression: 


FERMIN likely due to hypovolemia


CKD III with proteinuria


-No NSAIDs





Hyponatremia


-Encourage nutrition


-Hold HCTZ





Hypokalemia


-Replete potassium prn





Hypocalcemia


-Continue Vitamin D3





HTN with CKD


-Continue Metoprolol





DM II with CKD


-RISS


-Hold Metformin





Moderate malnutrition


-Encourage nutrition





Anemia in chronic illness


-Monitor H&H





Acute GNR Cystitis


-Abx changed to Meropenem





COVID-19 PNA


-Continue steroids


-Agree with anticoagulation


-Continue Oxygen supplementation





Case reviewed with Dr. Turner

## 2021-07-15 LAB
ALBUMIN SERPL BCP-MCNC: 2 G/DL (ref 3.4–5)
ALP SERPL-CCNC: 101 U/L (ref 45–117)
ALT SERPL W P-5'-P-CCNC: 28 U/L (ref 12–78)
AST SERPL W P-5'-P-CCNC: 49 U/L (ref 15–37)
BUN BLD-MCNC: 39 MG/DL (ref 7–18)
CRP SERPL-MCNC: 4.54 MG/L (ref ?–3)
FERRITIN SERPL-MCNC: 539.3 NG/ML (ref 8–388)
GLUCOSE SERPLBLD-MCNC: 159 MG/DL (ref 74–106)
HCT VFR BLD CALC: 26.8 % (ref 36–45)
LYMPHOCYTES # SPEC AUTO: 0.4 K/UL (ref 0.7–4.9)
MAGNESIUM SERPL-MCNC: 1.9 MG/DL (ref 1.8–2.4)
MORPHOLOGY BLD-IMP: (no result)
PMV BLD: 8.4 FL (ref 7.6–11.3)
POTASSIUM SERPL-SCNC: 4.2 MMOL/L (ref 3.5–5.1)
RBC # BLD: 2.85 M/UL (ref 3.86–4.86)

## 2021-07-15 RX ADMIN — LEVOTHYROXINE SODIUM SCH MG: 112 TABLET ORAL at 05:39

## 2021-07-15 RX ADMIN — HUMAN INSULIN SCH: 100 INJECTION, SOLUTION SUBCUTANEOUS at 07:30

## 2021-07-15 RX ADMIN — HUMAN INSULIN SCH UNIT: 100 INJECTION, SOLUTION SUBCUTANEOUS at 21:02

## 2021-07-15 RX ADMIN — HUMAN INSULIN SCH UNIT: 100 INJECTION, SOLUTION SUBCUTANEOUS at 16:56

## 2021-07-15 RX ADMIN — HUMAN INSULIN SCH UNIT: 100 INJECTION, SOLUTION SUBCUTANEOUS at 11:42

## 2021-07-15 RX ADMIN — ISOSORBIDE MONONITRATE SCH MG: 30 TABLET, EXTENDED RELEASE ORAL at 16:57

## 2021-07-15 RX ADMIN — ATORVASTATIN CALCIUM SCH MG: 20 TABLET, FILM COATED ORAL at 21:01

## 2021-07-15 RX ADMIN — DOXAZOSIN SCH MG: 2 TABLET ORAL at 21:01

## 2021-07-15 RX ADMIN — AMLODIPINE BESYLATE SCH MG: 10 TABLET ORAL at 07:47

## 2021-07-15 RX ADMIN — Medication SCH MG: at 07:47

## 2021-07-15 RX ADMIN — SODIUM CHLORIDE SCH MLS: 9 INJECTION, SOLUTION INTRAVENOUS at 10:19

## 2021-07-15 RX ADMIN — ACETAMINOPHEN PRN MG: 500 TABLET, FILM COATED ORAL at 21:00

## 2021-07-15 RX ADMIN — METOPROLOL TARTRATE SCH MG: 25 TABLET ORAL at 05:38

## 2021-07-15 RX ADMIN — ZINC SULFATE CAP 220 MG (50 MG ELEMENTAL ZN) SCH MG: 220 (50 ZN) CAP at 07:48

## 2021-07-15 RX ADMIN — SODIUM CHLORIDE SCH MLS: 9 INJECTION, SOLUTION INTRAVENOUS at 21:03

## 2021-07-15 RX ADMIN — Medication SCH ML: at 21:02

## 2021-07-15 RX ADMIN — Medication SCH ML: at 07:50

## 2021-07-15 RX ADMIN — ASPIRIN SCH MG: 81 TABLET, COATED ORAL at 07:48

## 2021-07-15 RX ADMIN — Medication SCH MG: at 16:57

## 2021-07-15 RX ADMIN — Medication SCH MG: at 12:31

## 2021-07-15 RX ADMIN — BARICITINIB SCH MG: 2 TABLET, FILM COATED ORAL at 07:51

## 2021-07-15 RX ADMIN — Medication SCH MG: at 21:01

## 2021-07-15 RX ADMIN — CHOLECALCIFEROL TAB 25 MCG (1000 UNIT) SCH UNIT: 25 TAB at 07:48

## 2021-07-15 RX ADMIN — METOPROLOL TARTRATE SCH MG: 25 TABLET ORAL at 16:57

## 2021-07-15 RX ADMIN — Medication SCH MG: at 07:49

## 2021-07-15 RX ADMIN — LIOTHYRONINE SODIUM SCH MCG: 5 TABLET ORAL at 05:39

## 2021-07-15 RX ADMIN — INSULIN GLARGINE SCH UNITS: 100 INJECTION, SOLUTION SUBCUTANEOUS at 21:02

## 2021-07-15 RX ADMIN — CLOPIDOGREL BISULFATE SCH MG: 75 TABLET, FILM COATED ORAL at 07:49

## 2021-07-15 RX ADMIN — LEVOTHYROXINE SODIUM SCH MG: 25 TABLET ORAL at 05:39

## 2021-07-15 RX ADMIN — DOXAZOSIN SCH MG: 2 TABLET ORAL at 07:49

## 2021-07-15 NOTE — P.PN
Date of Service: 07/15/21


Vital Signs











Temp Pulse Resp BP Pulse Ox


 


 98.9 F   81   15   142/56 H  92 


 


 07/15/21 16:00  07/15/21 16:57  07/15/21 16:00  07/15/21 16:57  07/15/21 16:00





Medications





Acetaminophen (Acetaminophen 500 Mg Tab)  500 mg PO Q4HP PRN


   PRN Reason: Pain scale 2-4 (Mild)


   Last Admin: 07/14/21 17:14 Dose:  500 mg


   Documented by: 


Allopurinol (Allopurinol 300 Mg Tab)  300 mg PO DAILY [facility]


   Last Admin: 07/15/21 07:51 Dose:  300 mg


   Documented by: 


Amlodipine Besylate (Amlodipine 10 Mg Tab)  10 mg PO DAILY [facility]


   Last Admin: 07/15/21 07:47 Dose:  10 mg


   Documented by: 


Ascorbic Acid (Ascorbic Acid 500 Mg Tablet)  500 mg PO QID [facility]


   Last Admin: 07/15/21 16:57 Dose:  500 mg


   Documented by: 


Aspirin (Aspirin Ec 81 Mg Tab)  81 mg PO DAILY [facility]


   Last Admin: 07/15/21 07:48 Dose:  81 mg


   Documented by: 


Atorvastatin Calcium (Atorvastatin 20 Mg Tab)  20 mg PO BEDTIME [facility]


   Last Admin: 07/14/21 21:00 Dose:  20 mg


   Documented by: 


Benzonatate (Benzonatate 100 Mg Cap)  100 mg PO TID PRN


   PRN Reason: COUGH


   Last Admin: 07/08/21 06:03 Dose:  100 mg


   Documented by: 


Cholecalciferol (Vitamin D 1000 Unit Tab)  2,000 unit PO DAILY [facility]


   Last Admin: 07/15/21 07:48 Dose:  2,000 unit


   Documented by: 


Clopidogrel Bisulfate (Clopidogrel 75 Mg Tablet)  75 mg PO DAILY [facility]


   Last Admin: 07/15/21 07:49 Dose:  75 mg


   Documented by: 


Dextrose (D50w 25 Gm/50 Ml Vial)  12.5 gm IV PRN PRN; Protocol


   PRN Reason: HYPOGLYCEMIA


Doxazosin Mesylate (Doxazosin 2 Mg Tab)  2 mg PO BID [facility]


   Last Admin: 07/15/21 07:49 Dose:  2 mg


   Documented by: 


Glucagon (Glucagon 1 Mg/Vial)  1 mg IM 1X PRN; Protocol


   PRN Reason: HYPOGLYCEMIA


Meropenem 1,000 mg/ Sodium (Chloride)  100 mls @ 200 mls/hr IV Q12HR [facility]


   Last Admin: 07/15/21 10:19 Dose:  100 mls


   Documented by: 


Insulin Glargine (Insulin Glargine 100 Units/Ml)  20 units SQ BEDTIME [facility]


   Last Admin: 07/14/21 20:59 Dose:  20 units


   Documented by: 


Insulin Human Regular (Insulin -Regular Human 50 Unit/0.5 Ml Ml)  0 unit SQ ACHS

[facility]; Protocol


   Last Admin: 07/15/21 16:56 Dose:  7 unit


   Documented by: 


Isosorbide Mononitrate (Isosorbide Mono Sr 30 Mg Tab)  30 mg PO DAILY AFTER 

SUPPER [facility]


   Last Admin: 07/15/21 16:57 Dose:  30 mg


   Documented by: 


Levothyroxine Sodium (Levothyroxine Sod 0.112 Mg Tab)  0.112 mg PO DAILYAC [facility]


   Last Admin: 07/15/21 05:39 Dose:  0.112 mg


   Documented by: 


Levothyroxine Sodium (Levothyroxine Sod 0.025 Mg Tab)  0.025 mg PO DAILYAC [facility]


   Last Admin: 07/15/21 05:39 Dose:  0.025 mg


   Documented by: 


Liothyronine Sodium (Liothyronine Sod 5 Mcg Tab)  5 mcg PO DAILYAC [facility]


   Last Admin: 07/15/21 05:39 Dose:  5 mcg


   Documented by: 


Melatonin (Melatonin 5 Mg Tablet)  5 mg PO BEDTIME PRN PRN


   PRN Reason: INSOMNIA


   Last Admin: 07/13/21 20:35 Dose:  5 mg


   Documented by: 


Metoprolol Tartrate (Metoprolol Tar 25 Mg Tab)  25 mg PO BID 6AM 6PM [facility]


   Last Admin: 07/15/21 16:57 Dose:  25 mg


   Documented by: 


Nutritional Formula (Ensure High Protein 237 Ml Can)  237 ml PO TID PRN


   PRN Reason: IF < 50% OF MEAL CONSUMED


Ondansetron HCl (Ondansetron 4 Mg/2 Ml Vial)  4 mg IV Q6HP PRN


   PRN Reason: NAUSEA / VOMITING


Prednisone (Prednisone 20 Mg Tab)  20 mg PO BID [facility]


   Last Admin: 07/15/21 07:48 Dose:  20 mg


   Documented by: 


Sodium Chloride (Flush Normal Saline 10 Ml)  10 ml IV BID [facility]


   Last Admin: 07/15/21 07:50 Dose:  10 ml


   Documented by: 


Thiamine HCl (Thiamine Hcl 100 Mg Tablet)  200 mg PO DAILY [facility]


   Last Admin: 07/15/21 07:49 Dose:  200 mg


   Documented by: 


Zinc Sulfate (Zinc Sulfate 220 Mg Cap)  220 mg PO DAILY [facility]


   Last Admin: 07/15/21 07:48 Dose:  220 mg


   Documented by: 


Microbiology Results





07/06/21 15:55   Blood  - Blood   Aerobic Blood Culture - Final


                            No growth in 5 days.


07/06/21 15:55   Blood  - Blood   Anaerobic Blood Culture - Final


                            No growth in 5 days.


07/06/21 15:38   Blood  - Blood   Aerobic Blood Culture - Final


                            No growth in 5 days.


07/06/21 15:38   Blood  - Blood   Anaerobic Blood Culture - Final


                            No growth in 5 days.


07/06/21 16:15   Clean Catch Urine   [place] Count - Final


                            >100,000 CFU/ML.


07/06/21 16:15   Clean Catch Urine    - Final


                            Klebsiella Pneumoniae


                            Proteus Mirabilis





Assessment/ Plan: 


Nephrology





Feeling better today.


Dyspnea with exertion.  Persistent hypoxia worse with activity.


No acute events overnight.





Vitals, medications, blood work and imaging reviewed in the chart.


General: Oriented x3, Cooperative


HEENT: Atraumatic


Neck: Supple


Respiratory: CTA


Cardiovascular: No edema, Regular rate/rhythm


Gastrointestinal: Soft and benign, Non-distended


Musculoskeletal: No clubbing, No contractures


Integumentary: No rashes, No cyanosis


Neurological: Normal speech





Laboratory Data (last 24 hrs)


07/06/21 15:38: PT 12.1, INR 1.05, APTT 24.4


07/06/21 15:38: WBC 9.50  D, Hgb 11.5 L, Hct 35.1 L, Plt Count 248  D


07/06/21 15:38: Sodium 130 L, Potassium 4.8, BUN 56 H D, Creatinine 2.16 H, 

Glucose 357 H, Total Bilirubin 0.6, AST 42 H, ALT 17, Alkaline Phosphatase 124 

H, Lipase 11 L





Imagings Data: 


EXAM DESCRIPTION:  RAD - Chest Single View - 7/6/2021 3:20 pm


CLINICAL HISTORY:  Weakness, COVID positive


COMPARISON:  June 30


TECHNIQUE:  AP portable chest image was obtained 7/6/2021 3:20 pm .


FINDINGS:  Lung volumes are low. Interstitial opacification is evident similar 

to comparison. No dense consolidation. There are some scattered alveolar 

opacities. Pattern is not specific but can be seen with a mild COVID-19 

pneumonia.


Sternotomy wires are in place. Trachea is midline. Heart and vasculature are 

normal. No measurable pleural effusion and no pneumothorax. No acute bony 

abnormality seen. No acute aortic findings suspected.


IMPRESSION:  Interstitial and patchy alveolar opacities are present.


Pattern is nonspecific but could indicate a mild COVID-19 pneumonia.





Conclusions/Impression: 


FERMIN likely due to hypovolemia


CKD III with proteinuria


-No NSAIDs





Hyponatremia


-Encourage nutrition


-Hold HCTZ





Hypokalemia


-Replete potassium prn





Hypocalcemia


-Continue Vitamin D3





HTN with CKD


-Continue Metoprolol and Doxazosin





DM II with CKD


-RISS


-Hold Metformin





Moderate malnutrition


-Encourage nutrition





Anemia in chronic illness


-Monitor H&H





Acute GNR Cystitis


-Continue Meropenem





COVID-19 PNA


-Continue steroids


-Agree with anticoagulation


-Continue Oxygen supplementation





Case reviewed with Dr. Turner

## 2021-07-16 LAB
ALBUMIN SERPL BCP-MCNC: 2.2 G/DL (ref 3.4–5)
ALP SERPL-CCNC: 110 U/L (ref 45–117)
ALT SERPL W P-5'-P-CCNC: 30 U/L (ref 12–78)
AST SERPL W P-5'-P-CCNC: 50 U/L (ref 15–37)
BUN BLD-MCNC: 41 MG/DL (ref 7–18)
CRP SERPL-MCNC: 4.34 MG/L (ref ?–3)
FERRITIN SERPL-MCNC: 544.1 NG/ML (ref 8–388)
GLUCOSE SERPLBLD-MCNC: 120 MG/DL (ref 74–106)
HCT VFR BLD CALC: 26.6 % (ref 36–45)
LYMPHOCYTES # SPEC AUTO: 0.4 K/UL (ref 0.7–4.9)
MAGNESIUM SERPL-MCNC: 2 MG/DL (ref 1.8–2.4)
PMV BLD: 7.8 FL (ref 7.6–11.3)
POTASSIUM SERPL-SCNC: 4.2 MMOL/L (ref 3.5–5.1)
RBC # BLD: 2.83 M/UL (ref 3.86–4.86)

## 2021-07-16 RX ADMIN — CHOLECALCIFEROL TAB 25 MCG (1000 UNIT) SCH UNIT: 25 TAB at 08:52

## 2021-07-16 RX ADMIN — HUMAN INSULIN SCH UNIT: 100 INJECTION, SOLUTION SUBCUTANEOUS at 17:03

## 2021-07-16 RX ADMIN — Medication SCH MG: at 20:39

## 2021-07-16 RX ADMIN — Medication SCH ML: at 20:40

## 2021-07-16 RX ADMIN — LEVOTHYROXINE SODIUM SCH MG: 112 TABLET ORAL at 05:50

## 2021-07-16 RX ADMIN — Medication SCH MG: at 08:56

## 2021-07-16 RX ADMIN — SODIUM CHLORIDE SCH MLS: 9 INJECTION, SOLUTION INTRAVENOUS at 08:51

## 2021-07-16 RX ADMIN — CLOPIDOGREL BISULFATE SCH MG: 75 TABLET, FILM COATED ORAL at 08:55

## 2021-07-16 RX ADMIN — DOCUSATE SODIUM SCH MG: 100 CAPSULE, LIQUID FILLED ORAL at 08:54

## 2021-07-16 RX ADMIN — INSULIN GLARGINE SCH UNITS: 100 INJECTION, SOLUTION SUBCUTANEOUS at 20:39

## 2021-07-16 RX ADMIN — ATORVASTATIN CALCIUM SCH MG: 20 TABLET, FILM COATED ORAL at 20:39

## 2021-07-16 RX ADMIN — LIOTHYRONINE SODIUM SCH MCG: 5 TABLET ORAL at 05:49

## 2021-07-16 RX ADMIN — DOXAZOSIN SCH MG: 2 TABLET ORAL at 08:52

## 2021-07-16 RX ADMIN — Medication SCH MG: at 17:03

## 2021-07-16 RX ADMIN — ZINC SULFATE CAP 220 MG (50 MG ELEMENTAL ZN) SCH MG: 220 (50 ZN) CAP at 08:54

## 2021-07-16 RX ADMIN — AMLODIPINE BESYLATE SCH MG: 10 TABLET ORAL at 08:56

## 2021-07-16 RX ADMIN — Medication SCH MG: at 08:54

## 2021-07-16 RX ADMIN — DOCUSATE SODIUM SCH MG: 100 CAPSULE, LIQUID FILLED ORAL at 20:38

## 2021-07-16 RX ADMIN — METOPROLOL TARTRATE SCH MG: 25 TABLET ORAL at 17:07

## 2021-07-16 RX ADMIN — HUMAN INSULIN SCH UNIT: 100 INJECTION, SOLUTION SUBCUTANEOUS at 12:42

## 2021-07-16 RX ADMIN — Medication SCH MG: at 12:43

## 2021-07-16 RX ADMIN — BARICITINIB SCH MG: 2 TABLET, FILM COATED ORAL at 08:54

## 2021-07-16 RX ADMIN — HUMAN INSULIN SCH: 100 INJECTION, SOLUTION SUBCUTANEOUS at 07:30

## 2021-07-16 RX ADMIN — Medication SCH ML: at 08:57

## 2021-07-16 RX ADMIN — HUMAN INSULIN SCH UNIT: 100 INJECTION, SOLUTION SUBCUTANEOUS at 20:39

## 2021-07-16 RX ADMIN — DOXAZOSIN SCH MG: 2 TABLET ORAL at 20:38

## 2021-07-16 RX ADMIN — LEVOTHYROXINE SODIUM SCH MG: 25 TABLET ORAL at 05:50

## 2021-07-16 RX ADMIN — METOPROLOL TARTRATE SCH MG: 25 TABLET ORAL at 05:49

## 2021-07-16 RX ADMIN — ISOSORBIDE MONONITRATE SCH MG: 30 TABLET, EXTENDED RELEASE ORAL at 17:03

## 2021-07-16 RX ADMIN — ASPIRIN SCH MG: 81 TABLET, COATED ORAL at 08:54

## 2021-07-16 NOTE — P.PN
Subjective


Date of Service: 07/14/21





Patient is clinically feeling better.  She wants to try to get home on Friday.  

Arrange for home oxygen.  Once this is set up anticipate discharge on Friday.





Review of Systems


10-point ROS is otherwise unremarkable





Physical Examination





- Vital Signs


Temperature: 97.8 F


Blood Pressure: 137/49


Pulse: 74


Respirations: 15


Pulse Ox (%): 94





- Physical Exam


General: Alert, In no apparent distress, Oriented x3


Respiratory: Diminished, Rhonchi/gurgles


Cardiovascular: Regular rate/rhythm, Normal S1 S2, No murmurs


Gastrointestinal: Normal bowel sounds, Soft and benign, Non-distended, No 

tenderness


Musculoskeletal: No clubbing, No swelling, No tenderness


Neurological: Sensation intact, Cranial nerves 3-12 intact





- Studies


Medications List Reviewed: Yes





Assessment & Plan





- Problems (Diagnosis)


(1) Acute respiratory failure due to severe acute respiratory syndrome 

coronavirus 2 (SARS-CoV-2) infection


Current Visit: Yes   Status: Acute   





(2) Coronary artery disease


Current Visit: No   Status: Chronic   





(3) Diabetes


Current Visit: No   Status: Chronic   





(4) Hyperlipidemia


Current Visit: No   Status: Chronic   





(5) Hypertension


Current Visit: No   Status: Chronic   





- Plan





1. Continue with IV steroids


2. Monitor inflammatory markers


3. Arrange for home oxygen


4. Once oxygen level is less than 4 L arrange for discharge


5. Pulmonary consultation appreciated


6. Continue with albuterol inhaler therapy; also supportive care


7. Monitor LFTs


8. GI and DVT prophylaxis





Discharge Plan: Home


Plan to discharge in: Greater than 2 days





- Advance Directives


Does patient have a Living Will: Yes


Does patient have a Durable POA for Healthcare: Yes





- Code Status/Comfort Care


Code Status Assessed: Yes


Code Status: Full Code


Critical Care: No


Time Spent Managing PTS Care (In Minutes): 35

## 2021-07-16 NOTE — P.PN
Date of Service: 07/16/21


Vital Signs











Temp Pulse Resp BP Pulse Ox


 


 97.6 F   76   17   142/52 H  94 


 


 07/16/21 12:00  07/16/21 17:07  07/16/21 16:00  07/16/21 17:07  07/16/21 16:00





Medications





Acetaminophen (Acetaminophen 500 Mg Tab)  500 mg PO Q4HP PRN


   PRN Reason: Pain scale 2-4 (Mild)


   Last Admin: 07/15/21 21:00 Dose:  500 mg


   Documented by: 


Allopurinol (Allopurinol 300 Mg Tab)  300 mg PO DAILY [facility]


   Last Admin: 07/16/21 08:54 Dose:  300 mg


   Documented by: 


Amlodipine Besylate (Amlodipine 10 Mg Tab)  10 mg PO DAILY [facility]


   Last Admin: 07/16/21 08:56 Dose:  10 mg


   Documented by: 


Ascorbic Acid (Ascorbic Acid 500 Mg Tablet)  500 mg PO QID [facility]


   Last Admin: 07/16/21 17:03 Dose:  500 mg


   Documented by: 


Aspirin (Aspirin Ec 81 Mg Tab)  81 mg PO DAILY [facility]


   Last Admin: 07/16/21 08:54 Dose:  81 mg


   Documented by: 


Atorvastatin Calcium (Atorvastatin 20 Mg Tab)  20 mg PO BEDTIME [facility]


   Last Admin: 07/15/21 21:01 Dose:  20 mg


   Documented by: 


Benzonatate (Benzonatate 100 Mg Cap)  100 mg PO TID PRN


   PRN Reason: COUGH


   Last Admin: 07/08/21 06:03 Dose:  100 mg


   Documented by: 


Bisacodyl (Bisacodyl E.C. 5 Mg Tab)  10 mg PO 1X ONE


   Stop: 07/16/21 19:01


Cholecalciferol (Vitamin D 1000 Unit Tab)  2,000 unit PO DAILY [facility]


   Last Admin: 07/16/21 08:52 Dose:  2,000 unit


   Documented by: 


Clopidogrel Bisulfate (Clopidogrel 75 Mg Tablet)  75 mg PO DAILY [facility]


   Last Admin: 07/16/21 08:55 Dose:  75 mg


   Documented by: 


Dextrose (D50w 25 Gm/50 Ml Vial)  12.5 gm IV PRN PRN; Protocol


   PRN Reason: HYPOGLYCEMIA


Docusate Sodium (Docusate Na 100 Mg Cap)  100 mg PO BID [facility]


   Last Admin: 07/16/21 08:54 Dose:  100 mg


   Documented by: 


Doxazosin Mesylate (Doxazosin 2 Mg Tab)  2 mg PO BID [facility]


   Last Admin: 07/16/21 08:52 Dose:  2 mg


   Documented by: 


Glucagon (Glucagon 1 Mg/Vial)  1 mg IM 1X PRN; Protocol


   PRN Reason: HYPOGLYCEMIA


Insulin Glargine (Insulin Glargine 100 Units/Ml)  20 units SQ BEDTIME [facility]


   Last Admin: 07/15/21 21:02 Dose:  20 units


   Documented by: 


Insulin Human Regular (Insulin -Regular Human 50 Unit/0.5 Ml Ml)  0 unit SQ ACHS

[facility]; Protocol


   Last Admin: 07/16/21 17:03 Dose:  5 unit


   Documented by: 


Isosorbide Mononitrate (Isosorbide Mono Sr 30 Mg Tab)  30 mg PO DAILY AFTER 

SUPPER [facility]


   Last Admin: 07/16/21 17:03 Dose:  30 mg


   Documented by: 


Levothyroxine Sodium (Levothyroxine Sod 0.112 Mg Tab)  0.112 mg PO DAILYAC [facility]


   Last Admin: 07/16/21 05:50 Dose:  0.112 mg


   Documented by: 


Levothyroxine Sodium (Levothyroxine Sod 0.025 Mg Tab)  0.025 mg PO DAILYAC [facility]


   Last Admin: 07/16/21 05:50 Dose:  0.025 mg


   Documented by: 


Liothyronine Sodium (Liothyronine Sod 5 Mcg Tab)  5 mcg PO DAILYAC [facility]


   Last Admin: 07/16/21 05:49 Dose:  5 mcg


   Documented by: 


Melatonin (Melatonin 5 Mg Tablet)  5 mg PO BEDTIME PRN PRN


   PRN Reason: INSOMNIA


   Last Admin: 07/13/21 20:35 Dose:  5 mg


   Documented by: 


Metoprolol Tartrate (Metoprolol Tar 25 Mg Tab)  25 mg PO BID 6AM 6PM [facility]


   Last Admin: 07/16/21 17:07 Dose:  25 mg


   Documented by: 


Nutritional Formula (Ensure High Protein 237 Ml Can)  237 ml PO TID PRN


   PRN Reason: IF < 50% OF MEAL CONSUMED


   Last Admin: 07/15/21 22:19 Dose:  237 ml


   Documented by: 


Ondansetron HCl (Ondansetron 4 Mg/2 Ml Vial)  4 mg IV Q6HP PRN


   PRN Reason: NAUSEA / VOMITING


Prednisone (Prednisone 20 Mg Tab)  20 mg PO BID [facility]


   Last Admin: 07/16/21 08:55 Dose:  20 mg


   Documented by: 


Sodium Chloride (Flush Normal Saline 10 Ml)  10 ml IV BID [facility]


   Last Admin: 07/16/21 08:57 Dose:  10 ml


   Documented by: 


Thiamine HCl (Thiamine Hcl 100 Mg Tablet)  200 mg PO DAILY [facility]


   Last Admin: 07/16/21 08:56 Dose:  200 mg


   Documented by: 


Zinc Sulfate (Zinc Sulfate 220 Mg Cap)  220 mg PO DAILY [facility]


   Last Admin: 07/16/21 08:54 Dose:  220 mg


   Documented by: 


Microbiology Results





07/06/21 15:55   Blood  - Blood   Aerobic Blood Culture - Final


                            No growth in 5 days.


07/06/21 15:55   Blood  - Blood   Anaerobic Blood Culture - Final


                            No growth in 5 days.


07/06/21 15:38   Blood  - Blood   Aerobic Blood Culture - Final


                            No growth in 5 days.


07/06/21 15:38   Blood  - Blood   Anaerobic Blood Culture - Final


                            No growth in 5 days.


07/06/21 16:15   Clean Catch Urine   [place] Count - Final


                            >100,000 CFU/ML.


07/06/21 16:15   Clean Catch Urine    - Final


                            Klebsiella Pneumoniae


                            Proteus Mirabilis





Assessment/ Plan: 


Nephrology





Constipation


Dyspnea with exertion.  Persistent hypoxia worse with activity.


No acute events overnight.





Vitals, medications, blood work and imaging reviewed in the chart.


General: Oriented x3, Cooperative


HEENT: Atraumatic


Neck: Supple


Respiratory: CTA


Cardiovascular: No edema, Regular rate/rhythm


Gastrointestinal: Soft and benign, Non-distended


Musculoskeletal: No clubbing, No contractures


Integumentary: No rashes, No cyanosis


Neurological: Normal speech





Laboratory Data (last 24 hrs)


07/06/21 15:38: PT 12.1, INR 1.05, APTT 24.4


07/06/21 15:38: WBC 9.50  D, Hgb 11.5 L, Hct 35.1 L, Plt Count 248  D


07/06/21 15:38: Sodium 130 L, Potassium 4.8, BUN 56 H D, Creatinine 2.16 H, 

Glucose 357 H, Total Bilirubin 0.6, AST 42 H, ALT 17, Alkaline Phosphatase 124 

H, Lipase 11 L





Imagings Data: 


EXAM DESCRIPTION:  RAD - Chest Single View - 7/6/2021 3:20 pm


CLINICAL HISTORY:  Weakness, COVID positive


COMPARISON:  June 30


TECHNIQUE:  AP portable chest image was obtained 7/6/2021 3:20 pm .


FINDINGS:  Lung volumes are low. Interstitial opacification is evident similar 

to comparison. No dense consolidation. There are some scattered alveolar 

opacities. Pattern is not specific but can be seen with a mild COVID-19 

pneumonia.


Sternotomy wires are in place. Trachea is midline. Heart and vasculature are 

normal. No measurable pleural effusion and no pneumothorax. No acute bony 

abnormality seen. No acute aortic findings suspected.


IMPRESSION:  Interstitial and patchy alveolar opacities are present.


Pattern is nonspecific but could indicate a mild COVID-19 pneumonia.





Conclusions/Impression: 


FERMIN likely due to hypovolemia


CKD III with proteinuria


-No NSAIDs





Hyponatremia


-Encourage nutrition


-Hold HCTZ





Hypokalemia


-Replete potassium prn





Hypocalcemia


-Continue Vitamin D3





HTN with CKD


-Continue Metoprolol and Doxazosin





DM II with CKD


-RISS


-Hold Metformin





Moderate malnutrition


-Encourage nutrition





Anemia in chronic illness


-Monitor H&H





Acute GNR Cystitis


-Continue Meropenem





COVID-19 PNA


-Continue steroids


-Agree with anticoagulation


-Continue Oxygen supplementation





Slow transit constipation


-Agree with mineral oil


-Start Colace

## 2021-07-16 NOTE — P.PN
Subjective


Date of Service: 07/16/21 (TV)


Primary Care Provider: none- previously Dr. montiel, nephrology Dr. Mckeon


Chief Complaint: NSTEMI, hypoxemia, COVID pneumonia


Exp desat/ SOB





Review of Systems


General: Weakness


Respiratory: Shortness of Breath





Physical Examination





- Vital Signs


Temperature: 97.6 F


Blood Pressure: 142/52


Pulse: 76


Respirations: 17


Pulse Ox (%): 94





- Studies


Medications List Reviewed: Yes





Assessment & Plan





- Problems (Diagnosis)


(1) Acute respiratory failure due to severe acute respiratory syndrome 

coronavirus 2 (SARS-CoV-2) infection


Current Visit: Yes   Status: Acute   


Plan: 


Exp desat/ started on Barcitinib/Increasing O2 requirement/Slight fever WBC 

increased. Fecal impaction/ Blood sputum and Stool

## 2021-07-16 NOTE — P.PN
Date of Service: 07/15/21











Subjective


Patient doing well and working well with physical therapy.  Continue with 

current plan of care.  Anticipate discharge in the morning if oxygen level is 

stable





Review of Systems


10-point ROS is otherwise unremarkable





Physical Examination





- Vital Signs


Reviewed 





- Physical Exam


General: Alert, In no apparent distress, Oriented x3


Respiratory:  Clear bilaterally


Cardiovascular: Regular rate/rhythm, Normal S1 S2, No murmurs


Gastrointestinal: Normal bowel sounds, Soft and benign, Non-distended, No 

tenderness


Musculoskeletal: No clubbing, No swelling, No tenderness


Neurological: Sensation intact, Cranial nerves 3-12 intact








Assessment & Plan





- Problems (Diagnosis)


(1) Acute respiratory failure due to severe acute respiratory syndrome 

coronavirus 2 (SARS-CoV-2) infection


Current Visit: Yes   Status: Acute   





(2) Coronary artery disease


Current Visit: No   Status: Chronic   





(3) Diabetes


Current Visit: No   Status: Chronic   





(4) Hyperlipidemia


Current Visit: No   Status: Chronic   





(5) Hypertension


Current Visit: No   Status: Chronic   





- Plan


Continue with plan of care as mentioned below:


1. Continue with IV steroids


2. Monitor inflammatory markers


3. Arrange for home oxygen


4. Once oxygen level is less than 4 L arrange for discharge


5. Pulmonary consultation appreciated


6. Continue with albuterol inhaler therapy; also supportive care


7. GI and DVT prophylaxis

## 2021-07-17 LAB
BUN BLD-MCNC: 29 MG/DL (ref 7–18)
CRP SERPL-MCNC: 4.67 MG/L (ref ?–3)
GLUCOSE SERPLBLD-MCNC: 58 MG/DL (ref 74–106)
HCT VFR BLD CALC: 25.2 % (ref 36–45)
LYMPHOCYTES # SPEC AUTO: 0.4 K/UL (ref 0.7–4.9)
MAGNESIUM SERPL-MCNC: 2 MG/DL (ref 1.8–2.4)
PMV BLD: 7.9 FL (ref 7.6–11.3)
POTASSIUM SERPL-SCNC: 4 MMOL/L (ref 3.5–5.1)
RBC # BLD: 2.67 M/UL (ref 3.86–4.86)

## 2021-07-17 RX ADMIN — DOCUSATE SODIUM SCH: 100 CAPSULE, LIQUID FILLED ORAL at 21:23

## 2021-07-17 RX ADMIN — METOPROLOL TARTRATE SCH MG: 25 TABLET ORAL at 17:07

## 2021-07-17 RX ADMIN — Medication SCH: at 21:00

## 2021-07-17 RX ADMIN — HUMAN INSULIN SCH: 100 INJECTION, SOLUTION SUBCUTANEOUS at 07:30

## 2021-07-17 RX ADMIN — Medication SCH MG: at 08:34

## 2021-07-17 RX ADMIN — Medication SCH MG: at 08:33

## 2021-07-17 RX ADMIN — BARICITINIB SCH MG: 2 TABLET, FILM COATED ORAL at 08:36

## 2021-07-17 RX ADMIN — METFORMIN HYDROCHLORIDE SCH MG: 500 TABLET, FILM COATED ORAL at 08:35

## 2021-07-17 RX ADMIN — DOXAZOSIN SCH MG: 2 TABLET ORAL at 08:35

## 2021-07-17 RX ADMIN — LIOTHYRONINE SODIUM SCH MCG: 5 TABLET ORAL at 05:48

## 2021-07-17 RX ADMIN — AMLODIPINE BESYLATE SCH MG: 10 TABLET ORAL at 08:30

## 2021-07-17 RX ADMIN — ZINC SULFATE CAP 220 MG (50 MG ELEMENTAL ZN) SCH MG: 220 (50 ZN) CAP at 08:35

## 2021-07-17 RX ADMIN — HUMAN INSULIN SCH: 100 INJECTION, SOLUTION SUBCUTANEOUS at 11:30

## 2021-07-17 RX ADMIN — Medication SCH ML: at 08:36

## 2021-07-17 RX ADMIN — DOXAZOSIN SCH MG: 2 TABLET ORAL at 21:23

## 2021-07-17 RX ADMIN — LEVOTHYROXINE SODIUM SCH MG: 25 TABLET ORAL at 05:48

## 2021-07-17 RX ADMIN — ASPIRIN SCH MG: 81 TABLET, COATED ORAL at 08:33

## 2021-07-17 RX ADMIN — LEVOTHYROXINE SODIUM SCH: 112 TABLET ORAL at 06:01

## 2021-07-17 RX ADMIN — CHOLECALCIFEROL TAB 25 MCG (1000 UNIT) SCH UNIT: 25 TAB at 08:34

## 2021-07-17 RX ADMIN — Medication SCH MG: at 12:41

## 2021-07-17 RX ADMIN — ATORVASTATIN CALCIUM SCH MG: 20 TABLET, FILM COATED ORAL at 21:24

## 2021-07-17 RX ADMIN — Medication SCH MG: at 21:24

## 2021-07-17 RX ADMIN — Medication SCH MG: at 17:06

## 2021-07-17 RX ADMIN — CLOPIDOGREL BISULFATE SCH MG: 75 TABLET, FILM COATED ORAL at 08:35

## 2021-07-17 RX ADMIN — METOPROLOL TARTRATE SCH MG: 25 TABLET ORAL at 05:48

## 2021-07-17 RX ADMIN — ISOSORBIDE MONONITRATE SCH MG: 30 TABLET, EXTENDED RELEASE ORAL at 17:07

## 2021-07-17 RX ADMIN — HUMAN INSULIN SCH UNIT: 100 INJECTION, SOLUTION SUBCUTANEOUS at 21:22

## 2021-07-17 RX ADMIN — HUMAN INSULIN SCH: 100 INJECTION, SOLUTION SUBCUTANEOUS at 16:30

## 2021-07-17 RX ADMIN — LEVOTHYROXINE SODIUM SCH MG: 112 TABLET ORAL at 05:50

## 2021-07-17 RX ADMIN — INSULIN GLARGINE SCH UNITS: 100 INJECTION, SOLUTION SUBCUTANEOUS at 21:23

## 2021-07-17 RX ADMIN — DOCUSATE SODIUM SCH MG: 100 CAPSULE, LIQUID FILLED ORAL at 08:34

## 2021-07-17 NOTE — P.PN
Subjective


Date of Service: 07/17/21


Primary Care Provider: none- previously Dr. montiel, nephrology Dr. Mckeon


Chief Complaint: NSTEMI, hypoxemia, COVID pneumonia


Subjective: Improving, Doing well





Physical Examination





- Vital Signs


Temperature: 97.3 F


Blood Pressure: 116/47


Pulse: 75


Respirations: 17


Pulse Ox (%): 94





- Studies


Medications List Reviewed: Yes





Assessment & Plan


Discharge Plan: Home


Plan to discharge in: 24 Hours


Physician Review Additional Text: 





Follow up CXR: 


COMPARISON:  None. 


FINDINGS:  Single view of the chest was obtained portable. No prior films are 

available for comparison. The lung volume is decreased. The heart is not 

enlarged. The thoracic aorta demonstrate intimal calcification. Sternotomy wires

suggest the possibility of previous CABG. There is a right upper extremity PICC 

line tip of the catheter within the cavoatrial junction. No significant pleural 

effusions and/or consolidations. External EKG leads within the field-of-view 

Limits diagnosis.   The rest of the soft tissue and bony structures demonstrate 

to be unremarkable. 


IMPRESSION:  Right upper extremity PICC line tip in good position. No focal 

areas of acute airspace disease





Physical Exam:


General: Patient alert, cooperative.  Currently on 5 L


HEENT: Atraumatic, PERRLA, Mucous membr. moist/pink


Neck: Supple, 2+ carotid pulse no bruit, No LAD


Respiratory: Patient much improved.  Better air movement noted.  Currently on 5 

L


Cardiovascular: Regular rate/rhythm, Normal S1 S2


Gastrointestinal: Normal bowel sounds, No tenderness


Musculoskeletal: No tenderness


Integumentary: No rashes


Neurological: Normal speech, Normal strength at 5/5 x4 extr, Normal tone, Normal

affect





Impression:


NSTEMI with history of CAD status post CABG 1996


Acute hypoxic respiratory failure secondary to COVID pneumonia


FERMIN superimposed on CKD 3


Diabetes mellitus type 2 with hyperglycemia


UTI, urine culture positive for Klebsiella and Proteus, Multidrug resistant


Hypertension


Hyperlipidemia








Plan


NSTEMI with history of CAD status post CABG 1996: Patient continues to improve. 

Overall stable.  Cardiology plans for no cardiac intervention due to her Covid 

infection.  Continue Covid treatment.  This has improved. Continue to wean off 

oxygen. Currently on 5 L. Maintain hemoglobin below's 4 L. If able to do this 

will consider discharge as early as today or tomorrow. Patient has completed 

treatment for UTI. Repeat culture negative. UTI prevention addressed. Continue 

with physical therapy.  Encourage ambulation, incentive spirometer. Anticipate 

discharge as early as tomorrow.


Acute hypoxic respiratory failure secondary to COVID pneumonia: Patient has been

transitioned to prednisone. Patient completed baricitinib.  Continue to monitor 

liver function. Continue to wean off oxygen. If consistently less than 4 L then 

will consider discharge. Likely discharge tomorrow with home oxygen.


FERMIN superimposed on CKD 3: Continue to do well. Renal function back to baseline.

Encourage oral intake.  Continue with nephrology recommendation.


Diabetes mellitus type 2 with hyperglycemia: Continue Accu-Cheks.  Will monitor 

closely.  Continue Lantus.  Will continue to make adjustments appropriately.


UTI, urine culture positive for Klebsiella and Proteus, Multidrug resistant: 

Repeat urine cultures obtainednegative. Patient completed course of meropenem 

x7 days. Remove PICC line.


Hypertension: Continue medicationCardura, Norvasc, metoprolol


Hyperlipidemia:  Continue home medicationLipitor.





Code status


DNR





DVT prophylaxis:


Aspirin and Plavix





Advanced Care planning-30 min:


Anticipate discharge likely tomorrow.


Time Spent Managing Pts Care (In Minutes): 55

## 2021-07-17 NOTE — PN
Date of Progress Note:  07/17/2021



Subjective:  The patient is alert, awake, and looking more comfortable than earlier this week.  The p
atient is breathing more comfortably on 2 to 3 L of nasal cannula oxygen.  Her O2 sats are in mid 90s
.  She is able to talk more comfortably.



Objective:  Vitals Signs:  Her blood pressure is stable around 120 systolic.  Last blood pressure was
 116/47, pulse is around 70 and regular, respirations are around 14 and comfortable.  She is afebrile
. 

Lungs:  Clear to auscultation anteriorly with decreased breath sounds with few wheezes that improved 
with cough. 

Abdomen:  Soft. 

Extremities:  Reveal no edema. 

Heart:  Sounds are regular.



Laboratory Data:  Reviewed.  The patient's labs show sodium with improvement to 142, potassium 4.0, c
hloride 110, bicarb is 30, BUN 29, creatinine 0.61.  CRP 4.67, glucose 91.  BNP level is 2662.  WBC c
ount 10, hemoglobin and hematocrit 8.5 and 25.2, platelet count of 178.



Assessment And Plan:  The patient with COVID-19 pneumonia, at this point recovering.  Oxygen requirem
ents are going down.  Blood pressure is stable.  The patient is close to euvolemic.  Encourage water 
intake with monitoring of basic metabolic panel.  The patient has improvement in sodium down to 142. 
 Once acute issues are resolved, the patient understands that she is quite deconditioned, may need so
me physical therapy and will need to keep her diet and p.o. intake adequate to continue to avoid bein
g dehydrated and will need a close followup with primary care team and Nephrology outpatient.  She se
es Dr. Mckeon and can continue to see him within a week or so after discharge.  The patient has been
 advised, but clinically she is improving oxygenations 

better.  Volume status is close to euvolemic.  Blood pressure is stable and improved.  Sodium improve
d.





MD/MODL

DD:  07/17/2021 15:33:05Voice ID:  [id number]

DT:  07/17/2021 15:48:28Report ID:  [id number]

## 2021-07-17 NOTE — P.PN
Date of Service: 07/16/21











Subjective


Patient had been doing well but her oxygen requirements have increased 

overnight.  Patient on 6 L of oxygen.  Satting in the low 90s.  Work with 

physical therapy & oxygen saturations dropped into the low 80s.  Hold discharge 

for the time being





Review of Systems


10-point ROS is otherwise unremarkable





Physical Examination





- Vital Signs


Reviewed 





- Physical Exam


General: Alert, In no apparent distress, Oriented x3


Respiratory:  Clear bilaterally


Cardiovascular: Regular rate/rhythm, Normal S1 S2, No murmurs


Gastrointestinal: Normal bowel sounds, Soft and benign, Non-distended, No 

tenderness


Musculoskeletal: No clubbing, No swelling, No tenderness


Neurological: Sensation intact, Cranial nerves 3-12 intact








Assessment & Plan





- Problems (Diagnosis)


(1) Acute respiratory failure due to severe acute respiratory syndrome 

coronavirus 2 (SARS-CoV-2) infection


Current Visit: Yes   Status: Acute   





(2) Coronary artery disease


Current Visit: No   Status: Chronic   





(3) Diabetes


Current Visit: No   Status: Chronic   





(4) Hyperlipidemia


Current Visit: No   Status: Chronic   





(5) Hypertension


Current Visit: No   Status: Chronic   





- Plan


Continue with plan of care as mentioned below:


1. Continue with IV steroids


2. Monitor inflammatory markers


3. Arrange for home oxygen


4. Once oxygen level is less than 4 L arrange for discharge


5. Pulmonary consultation appreciated


6. Continue with albuterol inhaler therapy; also supportive care


7. GI and DVT prophylaxis

## 2021-07-18 VITALS — DIASTOLIC BLOOD PRESSURE: 43 MMHG | SYSTOLIC BLOOD PRESSURE: 120 MMHG

## 2021-07-18 VITALS — OXYGEN SATURATION: 93 %

## 2021-07-18 VITALS — TEMPERATURE: 97.6 F

## 2021-07-18 LAB
BUN BLD-MCNC: 24 MG/DL (ref 7–18)
CRP SERPL-MCNC: < 2.9 MG/L (ref ?–3)
FERRITIN SERPL-MCNC: 451 NG/ML (ref 8–388)
GLUCOSE SERPLBLD-MCNC: 52 MG/DL (ref 74–106)
HCT VFR BLD CALC: 25.6 % (ref 36–45)
LYMPHOCYTES # SPEC AUTO: 0.4 K/UL (ref 0.7–4.9)
PMV BLD: 8.2 FL (ref 7.6–11.3)
POTASSIUM SERPL-SCNC: 4.3 MMOL/L (ref 3.5–5.1)
RBC # BLD: 2.71 M/UL (ref 3.86–4.86)

## 2021-07-18 RX ADMIN — Medication SCH MG: at 08:22

## 2021-07-18 RX ADMIN — LEVOTHYROXINE SODIUM SCH MG: 112 TABLET ORAL at 05:40

## 2021-07-18 RX ADMIN — Medication SCH: at 08:25

## 2021-07-18 RX ADMIN — CLOPIDOGREL BISULFATE SCH MG: 75 TABLET, FILM COATED ORAL at 08:23

## 2021-07-18 RX ADMIN — METOPROLOL TARTRATE SCH MG: 25 TABLET ORAL at 05:39

## 2021-07-18 RX ADMIN — DOXAZOSIN SCH MG: 2 TABLET ORAL at 08:24

## 2021-07-18 RX ADMIN — DOCUSATE SODIUM SCH: 100 CAPSULE, LIQUID FILLED ORAL at 08:25

## 2021-07-18 RX ADMIN — Medication SCH MG: at 13:29

## 2021-07-18 RX ADMIN — LIOTHYRONINE SODIUM SCH MCG: 5 TABLET ORAL at 05:39

## 2021-07-18 RX ADMIN — HUMAN INSULIN SCH: 100 INJECTION, SOLUTION SUBCUTANEOUS at 07:30

## 2021-07-18 RX ADMIN — LEVOTHYROXINE SODIUM SCH MG: 25 TABLET ORAL at 05:40

## 2021-07-18 RX ADMIN — AMLODIPINE BESYLATE SCH MG: 10 TABLET ORAL at 08:21

## 2021-07-18 RX ADMIN — METFORMIN HYDROCHLORIDE SCH MG: 500 TABLET, FILM COATED ORAL at 08:22

## 2021-07-18 RX ADMIN — CHOLECALCIFEROL TAB 25 MCG (1000 UNIT) SCH UNIT: 25 TAB at 08:08

## 2021-07-18 RX ADMIN — BARICITINIB SCH MG: 2 TABLET, FILM COATED ORAL at 08:24

## 2021-07-18 RX ADMIN — ASPIRIN SCH MG: 81 TABLET, COATED ORAL at 08:20

## 2021-07-18 RX ADMIN — HUMAN INSULIN SCH: 100 INJECTION, SOLUTION SUBCUTANEOUS at 11:30

## 2021-07-18 RX ADMIN — ZINC SULFATE CAP 220 MG (50 MG ELEMENTAL ZN) SCH MG: 220 (50 ZN) CAP at 08:23

## 2021-07-18 NOTE — P.DS
Admission Date: 07/06/21


Discharge Date: 07/18/21


Primary Care Provider: none- previously Dr. montiel, nephrology Dr. Mckeon


Disposition: DC HOME/HOME HEALTH CARE


Discharge Condition: GOOD


Reason for Admission: NSTEMI, hypoxemia, COVID pneumonia


Consultations: 





Pulmonary-Dr. Le


Nephrology-Dr. Mckeon


Cardiology-Dr. Yarbrough





Procedures: 








Follow up CXR: 


COMPARISON:  None. 


FINDINGS:  Single view of the chest was obtained portable. No prior films are 

available for comparison. The lung volume is decreased. The heart is not 

enlarged. The thoracic aorta demonstrate intimal calcification. Sternotomy wires

suggest the possibility of previous CABG. There is a right upper extremity PICC 

line tip of the catheter within the cavoatrial junction. No significant pleural 

effusions and/or consolidations. External EKG leads within the field-of-view 

Limits diagnosis.   The rest of the soft tissue and bony structures demonstrate 

to be unremarkable. 


IMPRESSION:  Right upper extremity PICC line tip in good position. No focal 

areas of acute airspace disease





ECHO:


MEASUREMENTS (cm)


   DIASTOLIC (NORMALS)              SYSTOLIC (NORMALS)


IVSd                 1.1 (0.6-1.2)                   LA Diam 4.3 (1.9-4.0)     

LVEF         45%  


LVIDd               3.7 (3.5-5.7)                       LVIDs      3.0 (2.0-3.5)

    %FS          20%


LVPWd             1.1 (0.6-1.2)


Ao Diam           2.4 (2.0-3.7)


2 DIMENSIONAL ASSESSMENT:











RIGHT ATRIUM:                   NORMAL LEFT ATRIUM:       DILATED


 


RIGHT VENTRICLE:            NORMAL LEFT VENTRICLE: NORMAL


 


TRICUSPID VALVE:             NORMAL MITRAL VALVE:    MITRAL ANNULAR 

CALCIFICATION


 


PULMONIC VALVE:             NORMAL AORTIC VALVE:     NORMAL


 


PERICARDIAL EFFUSION: NONE AORTIC ROOT:      NORMAL





LEFT VENTRICULAR WALL MOTION:     MILD GLOBAL HYPOKINESIS.


DOPPLER/COLOR FLOW:     MILD TRICUSPID REGURGTATION. MILD AORTIC STENOSIS. 

AORTIC VALVEL AREA 1.7 CENTIMETERS SQUARED.


COMMENTS:      MILD TRICUSPID REGURGTATION. MILD PULMONARY HYPERTENSION 41 mmHg.




MILD AORTIC STENOSIS. AORTIC VALVEL AREA 1.7 CENTIMETERS SQUARED. MILD GLOBAL 

HYPOKINESIS. LEFT VENTRICULAR EJECTION FRACTION 45%.








Medical Problem List:


NSTEMI with history of CAD status post CABG 1996


Acute hypoxic respiratory failure secondary to COVID pneumonia


FERMIN superimposed on CKD 3


Diabetes mellitus type 2 with hyperglycemia


UTI, urine culture positive for Klebsiella and Proteus, Multidrug resistant


Hypertension


Hyperlipidemia


Hypothyroidism


Obesity, BMI 33.9





Brief History of Present Illness: 








76 old  female with history of diabetes mellitus type 2, hypertension, 

CKD, hyperlipidemia, CAD presents emergency department for generalized weakness.

 Patient reports testing positive for Corona virus approximately 1 week prior.  

Patient reports at home she has had shortness of breath, intermittent arm pain. 

Patient evaluated in the emergency department, labs significant for sodium 130 

chloride 97 creatinine 2.16 GFR 22 BUN 56 glucose 357 lactic acid 2.6 ferritin 

2465 troponin 0.58  pro calcitonin 0.18 white blood cell count within 

normal limits.  Chest x-ray suggest COVID pneumonia.  Patient also hypoxic on 

room air, saturating in the low 80s.  Cardiology was contacted while patient is 

in the emergency department, recommends Lovenox and echocardiogram.  ED provider

wishes to admit for NSTEMI, acute respiratory failure. 


Hospital Course: 





Patient presented with shortness of breath secondary to acute hypoxic 

respiratory failure related to bilateral COVID-19 pneumonia.  The patient was 

admitted for treatment.  The patient also found to have elevated troponin 

suspicious for NSTEMI.  Patient with history of CAD and prior CABG in 1996.  

This was further complicated with UTI, urine culture positive for Klebsiella and

Proteusmultidrug-resistant and acute kidney insufficiency with underlying 

chronic kidney disease stage III.  The course of her stay was prolonged.





For her Covid pneumonia, the patient responded well to therapy.  This included 

IV steroids and baricitinib.  At discharge she is without significant shortness 

of breath.  Patient has been weaned down to 4 L per nasal cannula.  Patient 

ambulating well.  At discharge the patient will continue with prednisone 20 mg 1

pill twice daily for 7 days then 1 pill once daily for 7 days.  The patient will

be provided Tessalon Perles 100 mg 3 times a day as needed for cough.  The 

patient will also continue with vitamin supplementation including vitamin C 500 

mg 1 pill 3 times a day, vitamin D 2000 units daily, thiamine 100 mg daily, and 

zinc 220 mg daily.  Recommend to maintain oxygen saturations above 93%.  Patient

encouraged to continue with incentive spirometer, ambulation.  Patient will 

follow up with pulmonology in 1 to 2 weeks to [place] hospitalization.  

Pulmonology will help wean her off of oxygen.  Patient will continue with CDC 

guidelines on Covid 19 including handwashing, facemask use, social distancing 

and isolation.





For her NSTEMI.  Patient with underlying CAD and prior CABG in 1996.  Cardiology

evaluated the patient.  No cardiac intervention needed at this time.  At 

discharge she will continue with her medications including aspirin 81 mg daily, 

Plavix 25 mg daily, and Imdur 30 mg daily.  Recommend follow-up with cardiology 

in 1 to 2 weeks to follow-up hospitalization.  This can be further addressed as 

an outpatient.





Patient with hypertension.  Medications have been adjusted during the course of 

her stay.  At discharge she will continue with Norvasc 10 mg daily, Cardura 2 mg

1 pill twice daily, and metoprolol 25 mg 1 pill twice daily.  Recommend to katie sanabria blood pressure less than 130/80.  Further adjustment can be done by her 

PCP or cardiology.





Patient with acute renal injury with underlying chronic kidney disease stage 

III.  Patient was seen by nephrology.  Patient back to her baseline.  Recommend 

follow-up with nephrology as an outpatient to further monitor.  Recommend to 

recheck labBMP in 1 to 2 weeks to monitor her progress.





Patient with diabetes mellitus type 2 with hyperglycemia.  Patient insulin-

dependent.  At discharge she will continue with her medications including Lantus

20 mg at bedtime and insulin sliding scale as directed.  Recommend to 

discontinue Metformin and Actos at discharge due to her chronic renal disease.  

Sugars well controlled at discharge.  Hemoglobin A1c 7.4.  Recommend to recheck 

hemoglobin A1c every 3 months to monitor progress.  Recommend to maintain blood 

sugar less than 140 fasting and less than 200 for meals.  Further adjustment in 

her insulin may be required.  This can be done with the help of her PCP.  

Recommend follow-up with her PCP to further monitor and address.





As mentioned above patient was treated for her multidrug-resistant UTI.  Urine 

culture was positive for Klebsiella and Proteus.  Patient received 7 days of 

meropenem.  Repeat urine culture negative.  UTI prevention provided.





Patient with hyperlipidemia.  At discharge she will continue with Zocor 40 mg 

daily.





Patient with hypothyroidism.  At discharge she will continue with Synthroid 137 

mcg daily and Cytomel 5 mcg daily.








Vital Signs/Physical Exam: 














Temp Pulse Resp BP Pulse Ox


 


 98.6 F   71   17   116/42 L  92 


 


 07/18/21 00:00  07/18/21 00:00  07/18/21 00:00  07/18/21 00:00  07/18/21 00:00








General: Alert, In no apparent distress, Oriented x3, Cooperative


HEENT: Atraumatic


Neck: Supple


Respiratory: Clear to auscultation bilaterally, Other (Currently on 4 L per 

nasal cannula)


Cardiovascular: Normal pulses, Regular rate/rhythm


Gastrointestinal: Normal bowel sounds, No tenderness, No masses, No rebound, No 

guarding


Musculoskeletal: No erythema, No tenderness, No warmth


Integumentary: No tenderness/swelling


Neurological: Normal speech, Normal strength at 5/5 x4 extr, Normal tone, Normal

affect


Laboratory Data at Discharge: 














WBC  8.30 K/uL (4.3-10.9)  D 07/18/21  04:58    


 


Hgb  8.6 g/dL (12.0-15.0)  L  07/18/21  04:58    


 


Hct  25.6 % (36.0-45.0)  L  07/18/21  04:58    


 


Plt Count  148 K/uL (152-406)  L  07/18/21  04:58    


 


PT  12.1 SECONDS (9.5-12.5)   07/06/21  15:38    


 


INR  1.05   07/06/21  15:38    


 


APTT  24.4 SECONDS (24.3-36.9)   07/06/21  15:38    


 


Sodium  142 mmol/L (136-145)   07/17/21  04:58    


 


Potassium  4.0 mmol/L (3.5-5.1)   07/17/21  04:58    


 


BUN  29 mg/dL (7-18)  H  07/17/21  04:58    


 


Creatinine  0.61 mg/dL (0.55-1.3)   07/17/21  04:58    


 


Glucose  58 mg/dL ()  L  07/17/21  04:58    


 


Phosphorus  2.0 mg/dL (2.5-4.9)  L  07/17/21  04:58    


 


Magnesium  2.0 mg/dL (1.8-2.4)   07/17/21  04:58    


 


Total Bilirubin  0.8 mg/dL (0.2-1.0)   07/16/21  04:35    


 


AST  50 U/L (15-37)  H  07/16/21  04:35    


 


ALT  30 U/L (12-78)   07/16/21  04:35    


 


Alkaline Phosphatase  110 U/L ()   07/16/21  04:35    


 


Troponin I  Cancelled   07/08/21  05:56    


 


Triglycerides  264 mg/dL (<150)  H  07/07/21  05:03    


 


Cholesterol  137 mg/dL (<200)   07/07/21  05:03    


 


HDL Cholesterol  30 mg/dL (40-60)  L  07/07/21  05:03    


 


Cholesterol/HDL Ratio  4.57   07/07/21  05:03    


 


Lipase  11 U/L ()  L  07/06/21  15:38    








Home Medications: 








Aspirin [Aspirin EC 81 MG] 81 mg PO DAILY 07/04/15 


Clopidogrel Bisulfate [Clopidogrel] 75 mg PO DAILY 07/04/15 


Insulin Glargine Human [Lantus*] 20 unit SQ BEDTIME 07/04/15 


Isosorbide Mononitrate [Isosorbide Mononitrate ER] 30 mg PO DAILY AFTER SUPPER 

07/04/15 


Levothyroxine [Synthroid*] 137 mcg PO [id number] 07/04/15 


Liothyronine [Cytomel*] 5 mcg PO [id number] 07/04/15 


Simvastatin [Zocor*] 40 mg PO BEDTIME 07/04/15 


allopurinoL [Zyloprim*] 300 mg PO DAILY 07/04/15 


Doxazosin [Cardura*] 2 mg PO BID 07/07/21 


Insulin -Regular Human [Novolin -R*] See Protocol SQ ACHS 07/07/21 


Amlodipine [Norvasc*] 10 mg PO DAILY #30 tab 07/18/21 


Ascorbic Acid [Vitamin C*] 500 mg PO TID #90 tablet 07/18/21 


Benzonatate [Tessalon Perle*] 100 mg PO TID PRN #10 cap 07/18/21 


Cholecalciferol (Vitamin D3) [Vitamin D3] 2,000 unit PO DAILY #30 07/18/21 


Metoprolol Tartrate [Lopressor*] 25 mg PO BID 6AM 6PM #60 tab 07/18/21 


Thiamine HCl [Vitamin B-1*] 100 mg PO DAILY #30 tablet 07/18/21 


Zinc Sulfate [Zinc Sulfate*] 220 mg PO DAILY #30 cap 07/18/21 


predniSONE [Prednisone*] 20 mg PO SEECOM #21 tab 07/18/21 





New Medications: 


Metoprolol Tartrate [Lopressor*] 25 mg PO BID 6AM 6PM #60 tab


Amlodipine [Norvasc*] 10 mg PO DAILY #30 tab


predniSONE [Prednisone*] 20 mg PO SEECOM #21 tab


Benzonatate [Tessalon Perle*] 100 mg PO TID PRN #10 cap


 PRN Reason: Cough


Thiamine HCl [Vitamin B-1*] 100 mg PO DAILY #30 tablet


Ascorbic Acid [Vitamin C*] 500 mg PO TID #90 tablet


Cholecalciferol (Vitamin D3) [Vitamin D3] 2,000 unit PO DAILY #30


Zinc Sulfate [Zinc Sulfate*] 220 mg PO DAILY #30 cap


Physician Discharge Instructions: 


Patient presented with shortness of breath secondary to acute hypoxic 

respiratory failure related to bilateral COVID-19 pneumonia.  The patient was 

admitted for treatment.  The patient also found to have elevated troponin 

suspicious for NSTEMI.  Patient with history of CAD and prior CABG in 1996.  

This was further complicated with UTI, urine culture positive for Klebsiella and

Proteusmultidrug-resistant and acute kidney insufficiency with underlying 

chronic kidney disease stage III.  The course of her stay was prolonged.





For her Covid pneumonia, the patient responded well to therapy.  This included 

IV steroids and baricitinib.  At discharge she is without significant shortness 

of breath.  Patient has been weaned down to 4 L per nasal cannula.  Patient 

ambulating well.  At discharge the patient will continue with prednisone 20 mg 1

pill twice daily for 7 days then 1 pill once daily for 7 days.  The patient will

be provided Tessalon Perles 100 mg 3 times a day as needed for cough.  The 

patient will also continue with vitamin supplementation including vitamin C 500 

mg 1 pill 3 times a day, vitamin D 2000 units daily, thiamine 100 mg daily, and 

zinc 220 mg daily.  Recommend to maintain oxygen saturations above 93%.  Patient

encouraged to continue with incentive spirometer, ambulation.  Patient will 

follow up with pulmonology in 1 to 2 weeks to [place] hospitalization.  

Pulmonology will help wean her off of oxygen.  Patient will continue with CDC 

guidelines on Covid 19 including handwashing, facemask use, social distancing 

and isolation.





For her NSTEMI.  Patient with underlying CAD and prior CABG in 1996.  Cardiology

evaluated the patient.  No cardiac intervention needed at this time.  At 

discharge she will continue with her medications including aspirin 81 mg daily, 

Plavix 25 mg daily, and Imdur 30 mg daily.  Recommend follow-up with cardiology 

in 1 to 2 weeks to follow-up hospitalization.  This can be further addressed as 

an outpatient.





Patient with hypertension.  Medications have been adjusted during the course of 

her stay.  At discharge she will continue with Norvasc 10 mg daily, Cardura 2 mg

1 pill twice daily, and metoprolol 25 mg 1 pill twice daily.  Recommend to 

maintain blood pressure less than 130/80.  Further adjustment can be done by her

PCP or cardiology.





Patient with acute renal injury with underlying chronic kidney disease stage 

III.  Patient was seen by nephrology.  Patient back to her baseline.  Recommend 

follow-up with nephrology as an outpatient to further monitor.  Recommend to 

recheck labBMP in 1 to 2 weeks to monitor her progress.





Patient with diabetes mellitus type 2 with hyperglycemia.  Patient insulin-

dependent.  At discharge she will continue with her medications including Lantus

20 mg at bedtime and insulin sliding scale as directed.  Recommend to 

discontinue Metformin and Actos at discharge due to her chronic renal disease.  

Sugars well controlled at discharge.  Hemoglobin A1c 7.4.  Recommend to recheck 

hemoglobin A1c every 3 months to monitor progress.  Recommend to maintain blood 

sugar less than 140 fasting and less than 200 for meals.  Further adjustment in 

her insulin may be required.  This can be done with the help of her PCP.  

Recommend follow-up with her PCP to further monitor and address.





As mentioned above patient was treated for her multidrug-resistant UTI.  Urine 

culture was positive for Klebsiella and Proteus.  Patient received 7 days of 

meropenem.  Repeat urine culture negative.  UTI prevention provided.





Patient with hyperlipidemia.  At discharge she will continue with Zocor 40 mg 

daily.





Patient with hypothyroidism.  At discharge she will continue with Synthroid 137 

mcg daily and Cytomel 5 mcg daily.





Diet: ADA


Activity: Fall precautions


Followup: 


OOTOOT [Primary Care Provider] - 


Time spent managing pt's care (in minutes): 55